# Patient Record
Sex: FEMALE | Race: WHITE | Employment: FULL TIME | URBAN - METROPOLITAN AREA
[De-identification: names, ages, dates, MRNs, and addresses within clinical notes are randomized per-mention and may not be internally consistent; named-entity substitution may affect disease eponyms.]

---

## 2017-03-21 PROCEDURE — 82043 UR ALBUMIN QUANTITATIVE: CPT | Performed by: INTERNAL MEDICINE

## 2017-03-21 PROCEDURE — 82570 ASSAY OF URINE CREATININE: CPT | Performed by: INTERNAL MEDICINE

## 2018-04-14 PROCEDURE — 82043 UR ALBUMIN QUANTITATIVE: CPT | Performed by: INTERNAL MEDICINE

## 2018-04-14 PROCEDURE — 82570 ASSAY OF URINE CREATININE: CPT | Performed by: INTERNAL MEDICINE

## 2019-01-16 ENCOUNTER — HOSPITAL ENCOUNTER (INPATIENT)
Facility: HOSPITAL | Age: 64
LOS: 7 days | Discharge: NURSING, REHAB OR LONG-TERM CARE | DRG: 493 | End: 2019-01-23
Attending: EMERGENCY MEDICINE | Admitting: INTERNAL MEDICINE
Payer: COMMERCIAL

## 2019-01-16 ENCOUNTER — APPOINTMENT (OUTPATIENT)
Dept: GENERAL RADIOLOGY | Facility: HOSPITAL | Age: 64
DRG: 493 | End: 2019-01-16
Attending: EMERGENCY MEDICINE
Payer: COMMERCIAL

## 2019-01-16 ENCOUNTER — ANESTHESIA EVENT (OUTPATIENT)
Dept: SURGERY | Facility: HOSPITAL | Age: 64
DRG: 493 | End: 2019-01-16
Payer: COMMERCIAL

## 2019-01-16 ENCOUNTER — APPOINTMENT (OUTPATIENT)
Dept: GENERAL RADIOLOGY | Facility: HOSPITAL | Age: 64
DRG: 493 | End: 2019-01-16
Attending: INTERNAL MEDICINE
Payer: COMMERCIAL

## 2019-01-16 DIAGNOSIS — S82.891A CLOSED FRACTURE OF RIGHT ANKLE, INITIAL ENCOUNTER: Primary | ICD-10-CM

## 2019-01-16 DIAGNOSIS — S93.04XA DISLOCATION OF RIGHT ANKLE JOINT, INITIAL ENCOUNTER: ICD-10-CM

## 2019-01-16 LAB
ALBUMIN SERPL-MCNC: 3 G/DL (ref 3.1–4.5)
ALBUMIN/GLOB SERPL: 0.9 {RATIO} (ref 1–2)
ALP LIVER SERPL-CCNC: 99 U/L (ref 50–130)
ALT SERPL-CCNC: 27 U/L (ref 14–54)
ANION GAP SERPL CALC-SCNC: 5 MMOL/L (ref 0–18)
APTT PPP: 27 SECONDS (ref 26.1–34.6)
AST SERPL-CCNC: 25 U/L (ref 15–41)
BASOPHILS # BLD AUTO: 0.05 X10(3) UL (ref 0–0.1)
BASOPHILS NFR BLD AUTO: 0.5 %
BILIRUB SERPL-MCNC: 1.2 MG/DL (ref 0.1–2)
BUN BLD-MCNC: 13 MG/DL (ref 8–20)
BUN/CREAT SERPL: 13.7 (ref 10–20)
CALCIUM BLD-MCNC: 8.2 MG/DL (ref 8.3–10.3)
CHLORIDE SERPL-SCNC: 104 MMOL/L (ref 101–111)
CO2 SERPL-SCNC: 29 MMOL/L (ref 22–32)
CREAT BLD-MCNC: 0.95 MG/DL (ref 0.55–1.02)
EOSINOPHIL # BLD AUTO: 0.08 X10(3) UL (ref 0–0.3)
EOSINOPHIL NFR BLD AUTO: 0.9 %
ERYTHROCYTE [DISTWIDTH] IN BLOOD BY AUTOMATED COUNT: 13.2 % (ref 11.5–16)
EST. AVERAGE GLUCOSE BLD GHB EST-MCNC: 180 MG/DL (ref 68–126)
GLOBULIN PLAS-MCNC: 3.4 G/DL (ref 2.8–4.4)
GLUCOSE BLD-MCNC: 204 MG/DL (ref 65–99)
GLUCOSE BLD-MCNC: 322 MG/DL (ref 70–99)
GLUCOSE BLD-MCNC: 324 MG/DL (ref 65–99)
GLUCOSE BLD-MCNC: 335 MG/DL (ref 65–99)
HBA1C MFR BLD HPLC: 7.9 % (ref ?–5.7)
HCT VFR BLD AUTO: 37.8 % (ref 34–50)
HGB BLD-MCNC: 12.8 G/DL (ref 12–16)
IMMATURE GRANULOCYTE COUNT: 0.04 X10(3) UL (ref 0–1)
IMMATURE GRANULOCYTE RATIO %: 0.4 %
INR BLD: 1 (ref 0.9–1.1)
LYMPHOCYTES # BLD AUTO: 0.67 X10(3) UL (ref 0.9–4)
LYMPHOCYTES NFR BLD AUTO: 7.2 %
M PROTEIN MFR SERPL ELPH: 6.4 G/DL (ref 6.4–8.2)
MCH RBC QN AUTO: 30.3 PG (ref 27–33.2)
MCHC RBC AUTO-ENTMCNC: 33.9 G/DL (ref 31–37)
MCV RBC AUTO: 89.4 FL (ref 81–100)
MONOCYTES # BLD AUTO: 0.5 X10(3) UL (ref 0.1–1)
MONOCYTES NFR BLD AUTO: 5.4 %
NEUTROPHIL ABS PRELIM: 7.97 X10 (3) UL (ref 1.3–6.7)
NEUTROPHILS # BLD AUTO: 7.97 X10(3) UL (ref 1.3–6.7)
NEUTROPHILS NFR BLD AUTO: 85.6 %
OSMOLALITY SERPL CALC.SUM OF ELEC: 299 MOSM/KG (ref 275–295)
PLATELET # BLD AUTO: 240 10(3)UL (ref 150–450)
POTASSIUM SERPL-SCNC: 4.4 MMOL/L (ref 3.6–5.1)
PSA SERPL DL<=0.01 NG/ML-MCNC: 13.6 SECONDS (ref 12.4–14.7)
RBC # BLD AUTO: 4.23 X10(6)UL (ref 3.8–5.1)
RED CELL DISTRIBUTION WIDTH-SD: 42.7 FL (ref 35.1–46.3)
SODIUM SERPL-SCNC: 138 MMOL/L (ref 136–144)
WBC # BLD AUTO: 9.3 X10(3) UL (ref 4–13)

## 2019-01-16 PROCEDURE — 0QSGXZZ REPOSITION RIGHT TIBIA, EXTERNAL APPROACH: ICD-10-PCS | Performed by: EMERGENCY MEDICINE

## 2019-01-16 PROCEDURE — 0QSJXZZ REPOSITION RIGHT FIBULA, EXTERNAL APPROACH: ICD-10-PCS | Performed by: EMERGENCY MEDICINE

## 2019-01-16 PROCEDURE — 71045 X-RAY EXAM CHEST 1 VIEW: CPT | Performed by: INTERNAL MEDICINE

## 2019-01-16 PROCEDURE — 99223 1ST HOSP IP/OBS HIGH 75: CPT | Performed by: INTERNAL MEDICINE

## 2019-01-16 PROCEDURE — 73610 X-RAY EXAM OF ANKLE: CPT | Performed by: EMERGENCY MEDICINE

## 2019-01-16 PROCEDURE — 73590 X-RAY EXAM OF LOWER LEG: CPT | Performed by: EMERGENCY MEDICINE

## 2019-01-16 RX ORDER — SODIUM CHLORIDE 9 MG/ML
INJECTION, SOLUTION INTRAVENOUS CONTINUOUS
Status: ACTIVE | OUTPATIENT
Start: 2019-01-16 | End: 2019-01-16

## 2019-01-16 RX ORDER — ETOMIDATE 2 MG/ML
10 INJECTION INTRAVENOUS AS NEEDED
Status: DISCONTINUED | OUTPATIENT
Start: 2019-01-16 | End: 2019-01-22 | Stop reason: ALTCHOICE

## 2019-01-16 RX ORDER — HYDROMORPHONE HYDROCHLORIDE 1 MG/ML
0.5 INJECTION, SOLUTION INTRAMUSCULAR; INTRAVENOUS; SUBCUTANEOUS EVERY 30 MIN PRN
Status: DISCONTINUED | OUTPATIENT
Start: 2019-01-16 | End: 2019-01-16

## 2019-01-16 RX ORDER — LOSARTAN POTASSIUM 25 MG/1
25 TABLET ORAL DAILY
Status: DISCONTINUED | OUTPATIENT
Start: 2019-01-16 | End: 2019-01-22

## 2019-01-16 RX ORDER — HYDROCODONE BITARTRATE AND ACETAMINOPHEN 5; 325 MG/1; MG/1
2 TABLET ORAL EVERY 4 HOURS PRN
Status: DISCONTINUED | OUTPATIENT
Start: 2019-01-16 | End: 2019-01-17

## 2019-01-16 RX ORDER — MIDAZOLAM HYDROCHLORIDE 5 MG/ML
3 INJECTION INTRAMUSCULAR; INTRAVENOUS ONCE
Status: COMPLETED | OUTPATIENT
Start: 2019-01-16 | End: 2019-01-16

## 2019-01-16 RX ORDER — ASPIRIN 81 MG/1
81 TABLET ORAL DAILY
COMMUNITY

## 2019-01-16 RX ORDER — MELATONIN
325
Status: DISCONTINUED | OUTPATIENT
Start: 2019-01-16 | End: 2019-01-23

## 2019-01-16 RX ORDER — MORPHINE SULFATE 4 MG/ML
2 INJECTION, SOLUTION INTRAMUSCULAR; INTRAVENOUS EVERY 2 HOUR PRN
Status: DISCONTINUED | OUTPATIENT
Start: 2019-01-16 | End: 2019-01-23

## 2019-01-16 RX ORDER — ACETAMINOPHEN 325 MG/1
650 TABLET ORAL EVERY 4 HOURS PRN
Status: DISCONTINUED | OUTPATIENT
Start: 2019-01-16 | End: 2019-01-17

## 2019-01-16 RX ORDER — DEXTROSE MONOHYDRATE 25 G/50ML
50 INJECTION, SOLUTION INTRAVENOUS
Status: DISCONTINUED | OUTPATIENT
Start: 2019-01-16 | End: 2019-01-23

## 2019-01-16 RX ORDER — SODIUM CHLORIDE 9 MG/ML
1000 INJECTION, SOLUTION INTRAVENOUS ONCE
Status: COMPLETED | OUTPATIENT
Start: 2019-01-16 | End: 2019-01-16

## 2019-01-16 RX ORDER — ONDANSETRON 2 MG/ML
4 INJECTION INTRAMUSCULAR; INTRAVENOUS EVERY 6 HOURS PRN
Status: DISCONTINUED | OUTPATIENT
Start: 2019-01-16 | End: 2019-01-17

## 2019-01-16 RX ORDER — AMLODIPINE BESYLATE 5 MG/1
10 TABLET ORAL DAILY
Status: DISCONTINUED | OUTPATIENT
Start: 2019-01-16 | End: 2019-01-23

## 2019-01-16 RX ORDER — ONDANSETRON 2 MG/ML
4 INJECTION INTRAMUSCULAR; INTRAVENOUS EVERY 4 HOURS PRN
Status: DISCONTINUED | OUTPATIENT
Start: 2019-01-16 | End: 2019-01-16

## 2019-01-16 RX ORDER — MORPHINE SULFATE 4 MG/ML
INJECTION, SOLUTION INTRAMUSCULAR; INTRAVENOUS
Status: DISPENSED
Start: 2019-01-16 | End: 2019-01-16

## 2019-01-16 RX ORDER — FERROUS SULFATE TAB EC 324 MG (65 MG FE EQUIVALENT) 324 (65 FE) MG
1 TABLET DELAYED RESPONSE ORAL DAILY
COMMUNITY

## 2019-01-16 RX ORDER — MORPHINE SULFATE 4 MG/ML
2 INJECTION, SOLUTION INTRAMUSCULAR; INTRAVENOUS ONCE
Status: COMPLETED | OUTPATIENT
Start: 2019-01-16 | End: 2019-01-16

## 2019-01-16 RX ORDER — DEXTROSE MONOHYDRATE 25 G/50ML
50 INJECTION, SOLUTION INTRAVENOUS
Status: DISCONTINUED | OUTPATIENT
Start: 2019-01-16 | End: 2019-01-16

## 2019-01-16 RX ORDER — MORPHINE SULFATE 4 MG/ML
1 INJECTION, SOLUTION INTRAMUSCULAR; INTRAVENOUS EVERY 2 HOUR PRN
Status: DISCONTINUED | OUTPATIENT
Start: 2019-01-16 | End: 2019-01-23

## 2019-01-16 RX ORDER — MULTIVITAMIN
1 TABLET ORAL DAILY
COMMUNITY

## 2019-01-16 RX ORDER — MORPHINE SULFATE 4 MG/ML
4 INJECTION, SOLUTION INTRAMUSCULAR; INTRAVENOUS EVERY 2 HOUR PRN
Status: DISCONTINUED | OUTPATIENT
Start: 2019-01-16 | End: 2019-01-23

## 2019-01-16 RX ORDER — HYDROCODONE BITARTRATE AND ACETAMINOPHEN 5; 325 MG/1; MG/1
1 TABLET ORAL EVERY 4 HOURS PRN
Status: DISCONTINUED | OUTPATIENT
Start: 2019-01-16 | End: 2019-01-17

## 2019-01-16 RX ORDER — MORPHINE SULFATE 4 MG/ML
2 INJECTION, SOLUTION INTRAMUSCULAR; INTRAVENOUS ONCE
Status: DISCONTINUED | OUTPATIENT
Start: 2019-01-16 | End: 2019-01-16

## 2019-01-16 RX ORDER — ATORVASTATIN CALCIUM 10 MG/1
10 TABLET, FILM COATED ORAL NIGHTLY
Status: DISCONTINUED | OUTPATIENT
Start: 2019-01-16 | End: 2019-01-23

## 2019-01-16 NOTE — ED PROVIDER NOTES
Patient Seen in: [facility] Emergency Department    History   Patient presents with:  Fall (musculoskeletal, neurologic)    Stated Complaint: arrived via EMS post fall    HPI    The patient is a 73-year-old female who presents emergency room with a normocephalic, atraumatic. Pupils are 4 mm equally round and reactive to light. Oropharynx is clear. Mucous membranes are moist.  NECK: There is no focal tenderness to palpation appreciated. There is no JVD.  No meningeal signs or nuchal rigidity appreciate WITH DIFFERENTIAL WITH PLATELET    Narrative: The following orders were created for panel order CBC WITH DIFFERENTIAL WITH PLATELET.   Procedure                               Abnormality         Status                     --------- by Technologist)  The patient shares that she fell this morning and has pain and deformity to her right ankle. FINDINGS:  BONES:  There is osteopenia.   There is a complete oblique fracture involving the distal right fibula with proximal and posterior di hyperglycemia but no other acute abnormalities noted. Patient was given IV pain medication in the emergency room and the patient underwent conscious sedation for reduction of the right ankle as noted below.       The patient required sedation for reduction complaints.           Disposition and Plan     Clinical Impression:  Closed fracture of right ankle, initial encounter  (primary encounter diagnosis)  Dislocation of right ankle joint, initial encounter    Disposition:  Admit  1/16/2019  9:31 am    Follow-u

## 2019-01-16 NOTE — ED INITIAL ASSESSMENT (HPI)
Arrived via EMS for c/o R ankle pain post fall, (+) deformity noted. Pt states she slipped on ice in the driveway and fell. Pt denies LOC, denies hitting head, denies pain to back and neck. Denies any other injuries.  Pt given Fentanyl 100 mcg per EMS with

## 2019-01-16 NOTE — H&P
[facility]                                                               History & Physical         Kylie Sanon Patient Status:  Emergency    10/22/1955 MRN [id number]   Location [address] [facility] Attending Edmond French • Hypertension Mother    • Heart Disorder Mother    • Heart Disorder Father       Reviewed    Social history:   reports that  has never smoked. she has never used smokeless tobacco. She reports that she does not drink alcohol or use drugs.     Allergies: COMPARISON:  EDWARD , XR ANKLE (MIN 3 VIEWS), RIGHT (CPT=73610), 1/16/2019, 7:17.      INDICATIONS:  arrived via EMS post fall     PATIENT STATED HISTORY: (As transcribed by Technologist)  Patient provided no additional history at this time.          ==== 1/16/2019 at 7:39       Approved by: Halina Macdonald MD        Dictated by: Halina Macdonald MD on 1/16/2019 at 7:43       Approved by: Halina Macdonald MD                  Addended by Tresa Antunez MD on 1/16/2019  7:43 AM      Study Result 3.  Give IV pain medications. 2. Insulin-dependent diabetes mellitus type 1 on insulin pump at home and sees endocrinology Dr. Kurt Enciso as outpatient  1. we will consult Dr. Clair Augustin for diabetes mellitus type 2 insulin management in hospital  3.  Essential hype

## 2019-01-16 NOTE — ED NOTES
ANKLE REDUCED VIA DR Margarita Amado. GOOD CMS TO THE RLE PER MD ASSESSMENT. AWAITING POST REDUCTION XRAY.

## 2019-01-16 NOTE — PLAN OF CARE
Diabetes/Glucose Control    • Glucose maintained within prescribed range Progressing        PAIN - ADULT    • Verbalizes/displays adequate comfort level or patient's stated pain goal Progressing        Patient/Family Goals    • Patient/Family [facility]

## 2019-01-17 ENCOUNTER — APPOINTMENT (OUTPATIENT)
Dept: GENERAL RADIOLOGY | Facility: HOSPITAL | Age: 64
DRG: 493 | End: 2019-01-17
Attending: ORTHOPAEDIC SURGERY
Payer: COMMERCIAL

## 2019-01-17 ENCOUNTER — APPOINTMENT (OUTPATIENT)
Dept: CV DIAGNOSTICS | Facility: HOSPITAL | Age: 64
DRG: 493 | End: 2019-01-17
Attending: INTERNAL MEDICINE
Payer: COMMERCIAL

## 2019-01-17 ENCOUNTER — ANESTHESIA (OUTPATIENT)
Dept: SURGERY | Facility: HOSPITAL | Age: 64
DRG: 493 | End: 2019-01-17
Payer: COMMERCIAL

## 2019-01-17 LAB
ANION GAP SERPL CALC-SCNC: 7 MMOL/L (ref 0–18)
BASOPHILS # BLD AUTO: 0.04 X10(3) UL (ref 0–0.1)
BASOPHILS NFR BLD AUTO: 0.4 %
BUN BLD-MCNC: 16 MG/DL (ref 8–20)
BUN/CREAT SERPL: 16 (ref 10–20)
CALCIUM BLD-MCNC: 8.3 MG/DL (ref 8.3–10.3)
CHLORIDE SERPL-SCNC: 102 MMOL/L (ref 101–111)
CO2 SERPL-SCNC: 26 MMOL/L (ref 22–32)
CREAT BLD-MCNC: 1 MG/DL (ref 0.55–1.02)
EOSINOPHIL # BLD AUTO: 0.23 X10(3) UL (ref 0–0.3)
EOSINOPHIL NFR BLD AUTO: 2.4 %
ERYTHROCYTE [DISTWIDTH] IN BLOOD BY AUTOMATED COUNT: 13.3 % (ref 11.5–16)
GLUCOSE BLD-MCNC: 136 MG/DL (ref 65–99)
GLUCOSE BLD-MCNC: 160 MG/DL (ref 65–99)
GLUCOSE BLD-MCNC: 225 MG/DL (ref 70–99)
GLUCOSE BLD-MCNC: 248 MG/DL (ref 65–99)
GLUCOSE BLD-MCNC: 253 MG/DL (ref 65–99)
GLUCOSE BLD-MCNC: 301 MG/DL (ref 65–99)
GLUCOSE BLD-MCNC: 337 MG/DL (ref 65–99)
HAV IGM SER QL: 2.2 MG/DL (ref 1.8–2.5)
HCT VFR BLD AUTO: 36.9 % (ref 34–50)
HGB BLD-MCNC: 12.3 G/DL (ref 12–16)
IMMATURE GRANULOCYTE COUNT: 0.03 X10(3) UL (ref 0–1)
IMMATURE GRANULOCYTE RATIO %: 0.3 %
LYMPHOCYTES # BLD AUTO: 1.33 X10(3) UL (ref 0.9–4)
LYMPHOCYTES NFR BLD AUTO: 13.7 %
MCH RBC QN AUTO: 30.4 PG (ref 27–33.2)
MCHC RBC AUTO-ENTMCNC: 33.3 G/DL (ref 31–37)
MCV RBC AUTO: 91.3 FL (ref 81–100)
MONOCYTES # BLD AUTO: 0.97 X10(3) UL (ref 0.1–1)
MONOCYTES NFR BLD AUTO: 10 %
NEUTROPHIL ABS PRELIM: 7.12 X10 (3) UL (ref 1.3–6.7)
NEUTROPHILS # BLD AUTO: 7.12 X10(3) UL (ref 1.3–6.7)
NEUTROPHILS NFR BLD AUTO: 73.2 %
OSMOLALITY SERPL CALC.SUM OF ELEC: 288 MOSM/KG (ref 275–295)
PLATELET # BLD AUTO: 238 10(3)UL (ref 150–450)
POTASSIUM SERPL-SCNC: 4.6 MMOL/L (ref 3.6–5.1)
RBC # BLD AUTO: 4.04 X10(6)UL (ref 3.8–5.1)
RED CELL DISTRIBUTION WIDTH-SD: 44 FL (ref 35.1–46.3)
SODIUM SERPL-SCNC: 135 MMOL/L (ref 136–144)
WBC # BLD AUTO: 9.7 X10(3) UL (ref 4–13)

## 2019-01-17 PROCEDURE — 3E0T3BZ INTRODUCTION OF ANESTHETIC AGENT INTO PERIPHERAL NERVES AND PLEXI, PERCUTANEOUS APPROACH: ICD-10-PCS | Performed by: ANESTHESIOLOGY

## 2019-01-17 PROCEDURE — 76000 FLUOROSCOPY <1 HR PHYS/QHP: CPT | Performed by: ORTHOPAEDIC SURGERY

## 2019-01-17 PROCEDURE — 93306 TTE W/DOPPLER COMPLETE: CPT | Performed by: INTERNAL MEDICINE

## 2019-01-17 PROCEDURE — 0QSJ04Z REPOSITION RIGHT FIBULA WITH INTERNAL FIXATION DEVICE, OPEN APPROACH: ICD-10-PCS | Performed by: ORTHOPAEDIC SURGERY

## 2019-01-17 PROCEDURE — 99232 SBSQ HOSP IP/OBS MODERATE 35: CPT | Performed by: INTERNAL MEDICINE

## 2019-01-17 PROCEDURE — 0QSG04Z REPOSITION RIGHT TIBIA WITH INTERNAL FIXATION DEVICE, OPEN APPROACH: ICD-10-PCS | Performed by: ORTHOPAEDIC SURGERY

## 2019-01-17 DEVICE — IMPLANTS FOR ORTHOPEDIC BONE FIXATION.
Type: IMPLANTABLE DEVICE | Site: ANKLE | Status: FUNCTIONAL
Brand: VARIABLE ANGLE COMPRESSION SCREW

## 2019-01-17 DEVICE — SURGICAL INSTRUMENT OR ACCESSORY FOR ORTHOPEDIC BONE FIXATION
Type: IMPLANTABLE DEVICE | Site: ANKLE | Status: FUNCTIONAL
Brand: LAG SCREW KIT, 3.5MM

## 2019-01-17 DEVICE — IMPLANTS FOR ORTHOPEDIC BONE FIXATION
Type: IMPLANTABLE DEVICE | Site: ANKLE | Status: FUNCTIONAL
Brand: VARIABLE ANGLE LOCKING SCREW

## 2019-01-17 RX ORDER — HYDROCODONE BITARTRATE AND ACETAMINOPHEN 10; 325 MG/1; MG/1
1-2 TABLET ORAL
Qty: 60 TABLET | Refills: 0 | Status: SHIPPED | OUTPATIENT
Start: 2019-01-17

## 2019-01-17 RX ORDER — DEXTROSE MONOHYDRATE 25 G/50ML
50 INJECTION, SOLUTION INTRAVENOUS
Status: DISCONTINUED | OUTPATIENT
Start: 2019-01-17 | End: 2019-01-17 | Stop reason: HOSPADM

## 2019-01-17 RX ORDER — SODIUM CHLORIDE 9 MG/ML
INJECTION, SOLUTION INTRAVENOUS CONTINUOUS
Status: DISCONTINUED | OUTPATIENT
Start: 2019-01-17 | End: 2019-01-22

## 2019-01-17 RX ORDER — HYDROCODONE BITARTRATE AND ACETAMINOPHEN 10; 325 MG/1; MG/1
2 TABLET ORAL EVERY 4 HOURS PRN
Status: DISCONTINUED | OUTPATIENT
Start: 2019-01-17 | End: 2019-01-23

## 2019-01-17 RX ORDER — NALOXONE HYDROCHLORIDE 0.4 MG/ML
80 INJECTION, SOLUTION INTRAMUSCULAR; INTRAVENOUS; SUBCUTANEOUS AS NEEDED
Status: DISCONTINUED | OUTPATIENT
Start: 2019-01-17 | End: 2019-01-17 | Stop reason: HOSPADM

## 2019-01-17 RX ORDER — ONDANSETRON 2 MG/ML
4 INJECTION INTRAMUSCULAR; INTRAVENOUS EVERY 6 HOURS PRN
Status: DISCONTINUED | OUTPATIENT
Start: 2019-01-17 | End: 2019-01-23

## 2019-01-17 RX ORDER — HYDROMORPHONE HYDROCHLORIDE 1 MG/ML
0.4 INJECTION, SOLUTION INTRAMUSCULAR; INTRAVENOUS; SUBCUTANEOUS EVERY 5 MIN PRN
Status: DISCONTINUED | OUTPATIENT
Start: 2019-01-17 | End: 2019-01-17 | Stop reason: HOSPADM

## 2019-01-17 RX ORDER — MEPERIDINE HYDROCHLORIDE 25 MG/ML
12.5 INJECTION INTRAMUSCULAR; INTRAVENOUS; SUBCUTANEOUS AS NEEDED
Status: DISCONTINUED | OUTPATIENT
Start: 2019-01-17 | End: 2019-01-17 | Stop reason: HOSPADM

## 2019-01-17 RX ORDER — POLYETHYLENE GLYCOL 3350 17 G/17G
17 POWDER, FOR SOLUTION ORAL DAILY PRN
Status: DISCONTINUED | OUTPATIENT
Start: 2019-01-17 | End: 2019-01-23

## 2019-01-17 RX ORDER — BISACODYL 10 MG
10 SUPPOSITORY, RECTAL RECTAL
Status: DISCONTINUED | OUTPATIENT
Start: 2019-01-17 | End: 2019-01-23

## 2019-01-17 RX ORDER — HYDROCODONE BITARTRATE AND ACETAMINOPHEN 10; 325 MG/1; MG/1
2 TABLET ORAL AS NEEDED
Status: DISCONTINUED | OUTPATIENT
Start: 2019-01-17 | End: 2019-01-17 | Stop reason: HOSPADM

## 2019-01-17 RX ORDER — METOCLOPRAMIDE HYDROCHLORIDE 5 MG/ML
10 INJECTION INTRAMUSCULAR; INTRAVENOUS AS NEEDED
Status: DISCONTINUED | OUTPATIENT
Start: 2019-01-17 | End: 2019-01-17 | Stop reason: HOSPADM

## 2019-01-17 RX ORDER — DOCUSATE SODIUM 100 MG/1
100 CAPSULE, LIQUID FILLED ORAL 2 TIMES DAILY
Status: DISCONTINUED | OUTPATIENT
Start: 2019-01-17 | End: 2019-01-23

## 2019-01-17 RX ORDER — HYDROCODONE BITARTRATE AND ACETAMINOPHEN 10; 325 MG/1; MG/1
1 TABLET ORAL EVERY 4 HOURS PRN
Status: DISCONTINUED | OUTPATIENT
Start: 2019-01-17 | End: 2019-01-23

## 2019-01-17 RX ORDER — HYDROCODONE BITARTRATE AND ACETAMINOPHEN 10; 325 MG/1; MG/1
1-2 TABLET ORAL
Qty: 60 TABLET | Refills: 0 | Status: SHIPPED | OUTPATIENT
Start: 2019-01-17 | End: 2019-01-17

## 2019-01-17 RX ORDER — SODIUM CHLORIDE, SODIUM LACTATE, POTASSIUM CHLORIDE, CALCIUM CHLORIDE 600; 310; 30; 20 MG/100ML; MG/100ML; MG/100ML; MG/100ML
INJECTION, SOLUTION INTRAVENOUS CONTINUOUS
Status: DISCONTINUED | OUTPATIENT
Start: 2019-01-17 | End: 2019-01-17 | Stop reason: HOSPADM

## 2019-01-17 RX ORDER — ONDANSETRON 2 MG/ML
4 INJECTION INTRAMUSCULAR; INTRAVENOUS AS NEEDED
Status: DISCONTINUED | OUTPATIENT
Start: 2019-01-17 | End: 2019-01-17 | Stop reason: HOSPADM

## 2019-01-17 RX ORDER — CEFAZOLIN SODIUM 1 G/3ML
INJECTION, POWDER, FOR SOLUTION INTRAMUSCULAR; INTRAVENOUS
Status: DISCONTINUED | OUTPATIENT
Start: 2019-01-17 | End: 2019-01-17

## 2019-01-17 RX ORDER — HYDROCODONE BITARTRATE AND ACETAMINOPHEN 10; 325 MG/1; MG/1
1 TABLET ORAL AS NEEDED
Status: DISCONTINUED | OUTPATIENT
Start: 2019-01-17 | End: 2019-01-17 | Stop reason: HOSPADM

## 2019-01-17 NOTE — CONSULTS
[facility]  Report of Consultation    Junella Kawasaki Patient Status:  Inpatient    10/22/1955 MRN [id number]   [facility] 3SW-A Attending Dimple Pizarro MD   Hosp Day # 0 PCP None Pcp     Reason for Consultation:  Type 1 DM tab 325 mg, 325 mg, Oral, Daily with breakfast  •  acetaminophen (TYLENOL) tab 650 mg, 650 mg, Oral, Q4H PRN **OR** HYDROcodone-acetaminophen (NORCO) 5-325 MG per tab 1 tablet, 1 tablet, Oral, Q4H PRN **OR** HYDROcodone-acetaminophen (NORCO) 5-325 MG per t supraclavicular lymphadenopathy  Cardiovascular:  RRR; no murmurs 2+ pedal pulses  Lungs: CTAB, no wheezes or rales  Abd: soft, nontender, nondistended, active bowel sounds present  MSK: no clubbing, cyanosis, or edema. Right leg elevated and reagan splint.

## 2019-01-17 NOTE — PROGRESS NOTES
[facility]  Progress Note    Junella Kawasaki Patient Status:  Inpatient    10/22/1955 MRN [id number]   [facility] 3SW-A Attending Dimple Pizarro MD   Hosp Day # 1 PCP None Pcp       SUBJECTIVE:  No acute events overnight.   Hyper present  MSK: no clubbing, cyanosis, or edema. Right leg elevated and reagan splint.         Data Review:   Labs:   Lab Results   Component Value Date    WBC 9.7 01/17/2019    HGB 12.3 01/17/2019    HCT 36.9 01/17/2019    .0 01/17/2019    CREATSERUM 1.0 Min PRN   Or      dextrose 50 % injection 50 mL 50 mL Intravenous Q15 Min PRN   Or      glucose (DEX4) oral liquid 30 g 30 g Oral Q15 Min PRN   Or      Glucose-Vitamin C (DEX-4) 4-6 GM-MG chewable tab 8 tablet 8 tablet Oral Q15 Min PRN   Insulin Aspart Pen

## 2019-01-17 NOTE — CONSULTS
[facility]  Report of Consultation    Kylie Sanon Patient Status:  Inpatient    10/22/1955 MRN [id number]   [facility] 3SW-A Attending Flores Colby MD   [facility] Day # 1 PCP None Pcp     Reason for Consultation:  Right ankle trima Hypertension Mother    • Heart Disorder Mother    • Heart Disorder Father       reports that  has never smoked. she has never used smokeless tobacco. She reports that she does not drink alcohol or use drugs.     Allergies:  No Known Allergies    Medications review of systems was negative. 10 point system review    Physical Exam:    General: Alert, orientated x3. Cooperative. No apparent distress. Vital Signs:  Blood pressure 142/54, pulse 80, temperature 98.6 °F (37 °C), temperature source Oral, resp.  rate failure of surgery, and need for future surgery. We also discussed the surgical benefits, alternatives, surgical room allotments and personnel, expected outcome and expected recovery.   The patient is in agreement with our plan and all questions were satis

## 2019-01-17 NOTE — CONSULTS
[facility]  Report of Consultation    Norman Webb Patient Status:  Inpatient    10/22/1955 MRN [id number]   [facility] 3SW-A Attending Maria Teresa Cavazos MD   Hosp Day # 1 PCP None Pcp     Reason for Consultation:  Pre-op evaluat mg, Oral, Daily  •  ferrous sulfate EC tab 325 mg, 325 mg, Oral, Daily with breakfast  •  acetaminophen (TYLENOL) tab 650 mg, 650 mg, Oral, Q4H PRN **OR** HYDROcodone-acetaminophen (NORCO) 5-325 MG per tab 1 tablet, 1 tablet, Oral, Q4H PRN **OR** HYDROcodo excursions and effort. Abdomen: Soft, non-tender. Obese. Extremities: Without clubbing, cyanosis or edema. Right ankle immobilized. Neurologic: Alert and oriented, normal affect. Skin: Warm and dry.      Laboratories and Data:  Diagnostics:  EKG: NSR,

## 2019-01-17 NOTE — PLAN OF CARE
OR called regarding pt's blood sugars trending downwards since overnight. Overnight was in 300s and at 1600 was 136. Passed on to OR to watch this closely.

## 2019-01-17 NOTE — PLAN OF CARE
Diabetes/Glucose Control    • Glucose maintained within prescribed range Progressing        PAIN - ADULT    • Verbalizes/displays adequate comfort level or patient's stated pain goal Progressing        Patient/Family Goals    • Patient/Family [facility]

## 2019-01-17 NOTE — PAYOR COMM NOTE
--------------PATIENT IS SCHEDULED FOR ANKLE OPEN REDUCTION INTERNAL FIXATION TODAY    ADMISSION REVIEW     Payor: [address] #:  [id number]  Authorization Number: [id number]    Admit date: 1/16/19  Admit time: 9715 Systems    Positive for stated complaint: arrived via EMS post fall  Other systems are as noted in HPI. Constitutional and vital signs reviewed. All other systems reviewed and negative except as noted above.     Physical Exam     ED Triage Vitals [01/ extremities. There are no gross motor or sensory deficits appreciated. Patient is answering all questions appropriately.              ED Course     Labs Reviewed   COMP METABOLIC PANEL (14) - Abnormal; Notable for the following components:       Result Ira distal right fibula with proximal, lateral and dorsal displacement. There is a transverse fracture of the right lobe medial malleolus with lateral displacement. There is a posterior malleolar fracture with proximal and dorsal displacement and angulation. 7:43     Approved by: Evette Mujica MD             Result Date: 1/16/2019  CONCLUSION:  There is a complete oblique fracture involving the distal right fibula with proximal and posterior displacement with some angulation.   There is a transverse fractu minutes during which I was present. Admission disposition: 1/16/2019  9:31 AM                     Postreduction films showed the ankle to be back in more anatomic position.   The patient was placed into a short leg posterior mold with sugar tong componen Status post mechanical fall home and right ankle pain    History of Present Illness:  Giovanny Christensen is a 66year old female admitted  status post mechanical fall at home and constant right ankle pain since then.   Patient states she was walking in fron hospital admission)    Review of Systems:  A comprehensive 14 point review of systems was completed. Pertinent positives and negatives noted in the the HPI.     Physical Exam:     Vital signs: Blood pressure 155/79, pulse 90, temperature 98.6 °F (37 °C), t of the right talus in relation to the distal right tibia. Improved alignment of the trimalleolar fracture. Stable widening of the medial ankle joint space. Diffuse soft tissue swelling.            Dictated by: Aidan Ochoa MD on 1/16/2019 at 9:01 (CPT=73590)     TECHNIQUE:  AP and lateral views of the tibia and fibula were obtained. COMPARISON:  None.      INDICATIONS:  arrived via EMS post fall     PATIENT STATED HISTORY: (As transcribed by Technologist)  The patient shares that she fell this m pressure  4. Hyperlipidemia–continue statin  5. Pre-op- will get EKG, CXR. Quality:  · DVT Prophylaxis: SCD.   Pharmacological prophylaxis when okay with Ortho  · CODE status: full  · Snider: no    Plan of care discussed with patient , pt's sister in l insulin detemir (LEVEMIR) 100 UNIT/ML flextouch 20 Units     Date Action Dose Route User    1/16/2019 1509 Given 20 Units Subcutaneous (Left Upper Arm) Faye Swenson RN      insulin detemir (LEVEMIR) 100 UNIT/ML flextouch 25 Units     Date Action Do History      Not on file    Tobacco Use      Smoking status: Never Smoker      Smokeless tobacco: Never Used    Substance and Sexual Activity      Alcohol use: No        Frequency: Never      Drug use: No      Sexual activity: Not on file    Other Topics Medications:  insulin detemir (LEVEMIR) 100 UNIT/ML flextouch 25 Units 25 Units Subcutaneous Daily   etomidate (AMIDATE) solution 10 mg 10 mg Intravenous PRN   atorvastatin (LIPITOR) tab 10 mg 10 mg Oral Nightly   Losartan Potassium (COZAAR) tab 25 mg 25 m discharge     Closed right ankle fracture - Ortho consulted.  Will be going to the OR later today     Essential Hypertension - continue ARB     Mixed Hyperlipidemia - continue statin                        Plan of care discussed with patient/family at bedsi she does not drink alcohol or use drugs. She is single. She works full time for [employer].      Allergies:  No Known Allergies        Review of Systems:  All systems were reviewed and are negative except as described above in HPI.     Physical Exam:  Blood pressu Closed fracture of right ankle, initial encounter     Dislocation of right ankle joint, initial encounter     Abnormal EKG     EKG changes are more likely due to body habitus (low voltage) and lead misplacement (PRWP).   Normal cardiac exam.  Will review Past Medical History:   Diagnosis Date   • Diabetes [facility])     • Essential hypertension              Past Surgical History:   Procedure Laterality Date   • ABSCESS DRAINAGE X-RAY/US/CT CTRL       • REMOVAL OF OVARIAN CYST(S)                Family History 01/16/2019     ALKPHO 99 01/16/2019     BILT 1.2 01/16/2019     TP 6.4 01/16/2019     AST 25 01/16/2019     ALT 27 01/16/2019     PTT 27.0 01/16/2019     INR 1.00 01/16/2019     PTP 13.6 01/16/2019     PGLU 204 01/16/2019             Recent Labs      01/16

## 2019-01-17 NOTE — PLAN OF CARE
PAIN - ADULT    • Verbalizes/displays adequate comfort level or patient's stated pain goal Progressing        SAFETY ADULT - FALL    • Free from fall injury Progressing            A&O x 4. VSS. On RA. RLE pain controlled with Morphine and Norco PRN.  Post m

## 2019-01-17 NOTE — PROGRESS NOTES
[facility]  Progress Note    Chidi Augustin Patient Status:  Inpatient    10/22/1955 MRN [id number]   [facility] 3SW-A Attending Matt Avitia MD   Hosp Day # 1 PCP None Pcp     CC: Status post mechanical fall at home and right an CO2 26.0 01/17/2019     01/17/2019    CA 8.3 01/17/2019    MG 2.2 01/17/2019    PGLU 136 01/17/2019       Imaging:   XR CHEST AP PORTABLE      TECHNIQUE:  AP chest radiograph was obtained. COMPARISON:  None.      INDICATIONS:  pre-op     PATIE Daily with breakfast   acetaminophen (TYLENOL) tab 650 mg 650 mg Oral Q4H PRN   Or      HYDROcodone-acetaminophen (NORCO) 5-325 MG per tab 1 tablet 1 tablet Oral Q4H PRN   Or      HYDROcodone-acetaminophen (NORCO) 5-325 MG per tab 2 tablet 2 tablet Oral Q4 hypertension, morbid obesity        Quality:  · DVT Prophylaxis: SCD.   Pharmacological prophylaxis when okay with Ortho  · CODE status: full  · Snider: no      Estimated date of discharge: TBD  Discharge is dependent on: Clinical progress  At this point Ms.

## 2019-01-18 LAB
ATRIAL RATE: 88 BPM
GLUCOSE BLD-MCNC: 213 MG/DL (ref 65–99)
GLUCOSE BLD-MCNC: 231 MG/DL (ref 65–99)
GLUCOSE BLD-MCNC: 255 MG/DL (ref 65–99)
GLUCOSE BLD-MCNC: 304 MG/DL (ref 65–99)
P AXIS: 64 DEGREES
P-R INTERVAL: 152 MS
Q-T INTERVAL: 358 MS
QRS DURATION: 82 MS
QTC CALCULATION (BEZET): 433 MS
R AXIS: -39 DEGREES
T AXIS: 15 DEGREES
VENTRICULAR RATE: 88 BPM

## 2019-01-18 PROCEDURE — 99232 SBSQ HOSP IP/OBS MODERATE 35: CPT | Performed by: INTERNAL MEDICINE

## 2019-01-18 RX ORDER — HYDRALAZINE HYDROCHLORIDE 20 MG/ML
10 INJECTION INTRAMUSCULAR; INTRAVENOUS EVERY 6 HOURS PRN
Status: DISCONTINUED | OUTPATIENT
Start: 2019-01-18 | End: 2019-01-23

## 2019-01-18 RX ORDER — KETOROLAC TROMETHAMINE 30 MG/ML
30 INJECTION, SOLUTION INTRAMUSCULAR; INTRAVENOUS EVERY 6 HOURS
Status: COMPLETED | OUTPATIENT
Start: 2019-01-18 | End: 2019-01-19

## 2019-01-18 RX ORDER — HYDRALAZINE HYDROCHLORIDE 20 MG/ML
10 INJECTION INTRAMUSCULAR; INTRAVENOUS EVERY 6 HOURS PRN
Status: DISCONTINUED | OUTPATIENT
Start: 2019-01-18 | End: 2019-01-18

## 2019-01-18 NOTE — OCCUPATIONAL THERAPY NOTE
OCCUPATIONAL THERAPY EVALUATION - INPATIENT     Room Number: 380/380-A  Evaluation Date: 1/18/2019  Type of Evaluation: Initial  Presenting Problem: (R ankle ORIF)    Physician Order: IP Consult to Occupational Therapy  Reason for Therapy: ADL/IADL Dysfunc to heal    OBJECTIVE  Precautions: None  Fall Risk: High fall risk    WEIGHT BEARING RESTRICTION  Weight Bearing Restriction: R lower extremity        R Lower Extremity: Non-Weight Bearing       PAIN ASSESSMENT  Ratin          COGNITION  Overall Cognit In sitting, patient denied RLE pain, reports numbness. Patient encouraged to sit up at edge of bed , BP checked 160/93. O2 not reading well, oxygen re-applied. OT demonstrated use of RW while NWB on RLE. Patient reports she feels more dizzy while sitting. patient should achieve modified independent level in ADLs, light or modified IADLs.        Patient Complexity  Occupational Profile/Medical History LOW - Brief history including review of medical or therapy records    Specific performance deficits impacting

## 2019-01-18 NOTE — PROGRESS NOTES
S/p left ankle orif  Doing well  NVI  Comp soft  No calf pain  Pain controlled  NWB LLE  DC home tomorrow  F/u donna in 1 week

## 2019-01-18 NOTE — PROGRESS NOTES
NURSING ADMISSION NOTE      Patient admitted via bed. Oriented to room. Safety precautions initiated. Bed in low position. Call light in reach. Pt denies pain/numbness/tingling. VS stable. Ace wrap and ice packs to RLE.   All needs met, [facility]

## 2019-01-18 NOTE — PLAN OF CARE
Diabetes/Glucose Control    • Glucose maintained within prescribed range Progressing        PAIN - ADULT    • Verbalizes/displays adequate comfort level or patient's stated pain goal Progressing        Patient/Family Goals    • Patient/Family [facility]

## 2019-01-18 NOTE — PLAN OF CARE
Per Dr. Channing Blue, pt requests insulin pump to be reattached. RN received pump from security. Page sent to endo for orders for pump. Will do insulin pump contract with pt. Dr. Reuben King returned call and spoke with pt and RN.  Pt states that homero fisher

## 2019-01-18 NOTE — CM/SW NOTE
01/18/19 1300   CM/SW Referral Data   Referral Source Physician   Reason for Referral Discharge planning   Informant Patient   Patient Info   Patient's Mental Status Alert;Oriented   Patient's [address]   Number of Levels in Home 1   Number

## 2019-01-18 NOTE — OPERATIVE REPORT
[facility]    PATIENT'S NAME: Drake Salgado   ATTENDING PHYSICIAN: Brice Smith M.D. OPERATING PHYSICIAN: Krzysztof Hutchins M.D.    PATIENT ACCOUNT#:   [de-identified]    LOCATION:  [address] [place] [address]  MEDICAL RECORD #:   [id number]       DATE OF BIRTH: screws, excellent fixation was seen. We irrigated the wounds, stressed the ankle. No medial clear space finding or instability was noted. We closed our wounds with 0 Vicryl, 2-0 Vicryl, and staples.   The patient was placed in a soft sterile dressing, mo

## 2019-01-18 NOTE — PROGRESS NOTES
Post Op Day 1 Ortho Note    Status Post Nerve Block:  Type of Nerve Block: Right popliteal and saphenous block  Single Injection Nerve Block x 2    Post op review: No evidence of immediate block related complications, No paresthesia noted and Patients with

## 2019-01-18 NOTE — PHYSICAL THERAPY NOTE
PHYSICAL THERAPY EVALUATION - INPATIENT     Room Number: 380/380-A  Evaluation Date: 1/18/2019  Type of Evaluation: Initial  Physician Order: PT Eval and Treat    Presenting Problem: right ankle trimalleolar fracture s/p ORIf (1/17/19)  Reason for Th RESTRICTION  Weight Bearing Restriction: R lower extremity        R Lower Extremity: Non-Weight Bearing       PAIN ASSESSMENT  Ratin  Location: denies pain       COGNITION  · Safety Judgement:  decreased awareness of need for assistance and decreased a verbalizes understanding. Pt demonstrates bed mobility with mod asst for RLE advancement and trunk support with max verbal and tactile cues for sequencing. Pt with c/o dizziness upon sitting; blood pressure 160/93 taken on left lower leg.  Pt with difficult level in bed mobility, transfers, short distance ambulation, and stoop negotiation. DISCHARGE RECOMMENDATIONS  PT Discharge Recommendations: Sub-acute rehabilitation(ELOS of 16-19 days)    PLAN  PT Treatment Plan: Bed mobility; Endurance; Energy conservat

## 2019-01-18 NOTE — ANESTHESIA POSTPROCEDURE EVALUATION
[facility] Patient Status:  Inpatient   Age/Gender 66year old female MRN [id number]   Location [address] [facility] Attending Elba Henderson MD   [facility] Day # 1 PCP None Pcp       Anesthesia Post-op Note    Procedure(s):  OPEN R

## 2019-01-18 NOTE — PROGRESS NOTES
[facility]  Progress Note    Jaciel Watson Patient Status:  Inpatient    10/22/1955 MRN [id number]   [facility] 3SW-A Attending Christine Huertas MD   Hosp Day # 2 PCP None Pcp     CC: Status post mechanical fall at home and right an pre-op     PATIENT STATED HISTORY: (As transcribed by Technologist)  Patient fell on ice this morning and has a ankle fx and this is for a pre-op tomorrow.          FINDINGS:  No focal consolidation, pleural effusion, or pneumothorax.     =====  CONCLUSION: HYDROcodone-acetaminophen (NORCO)  MG per tab 1 tablet 1 tablet Oral Q4H PRN   Or      HYDROcodone-acetaminophen (NORCO)  MG per tab 2 tablet 2 tablet Oral Q4H PRN   etomidate (AMIDATE) solution 10 mg 10 mg Intravenous PRN   atorvastatin (LIP management in hospital  3. Essential hypertension–continue home medication. Follow blood pressure  4. Hyperlipidemia–continue statin  5.  Intermediate risk of surgery due to underlying medical problems including diabetes, hypertension, morbid obesity

## 2019-01-18 NOTE — PROGRESS NOTES
[facility]  Cardiology Progress Note    Osmel Mccarthy Patient Status:  Inpatient    10/22/1955 MRN [id number]   [facility] 3SW-A Attending John Membreno MD   Hosp Day # 2 PCP None Pcp     Subjective:  Having a lot of pain in RLE, Thanks.     Brianna Kaye MD

## 2019-01-18 NOTE — PROGRESS NOTES
ENDOCRINOLOGY PROGRESS NOTE    Typed by Hermelindo Wray MD on 1/18/2019      S:  Pt resting in bed. Feeling fatigued after trying some physical therapy today.       O:  BP (!) 165/65 (BP Location: Left leg)   Pulse 91   Temp 98.2 °F (36.8 °C) (Oral AND PLAN:    1. Type 1 DM: uncontrolled with hyperglycemia: HgA1C 7.9%  2. Long term insulin use  3.  Insulin pump status      · Adjust regimen as follows:     Levemir 30 Units with breakfast     Novolog 1 Unit for every 5 grams of carbs with meals     [place]

## 2019-01-18 NOTE — PLAN OF CARE
Pt's BP remains elevated even with pain medications. Dr. Lien Zelaya paged regarding hydralazine being ordered with no parameters of BP. Awaiting new orders.

## 2019-01-18 NOTE — ANESTHESIA PREPROCEDURE EVALUATION
PRE-OP EVALUATION    Patient Name: John Vela    Pre-op Diagnosis: FRACTURE RIGHT ANKLE    Procedure(s):  OPEN REDUCTION INTERNAL FIXATION RIGHT ANKLE    Surgeon(s) and Role:     Rayo Cueto MD - Primary    Pre-op vitals reviewed.   Temp: 98 injection 4 mg 4 mg Intravenous Q6H PRN   [COMPLETED] morphINE sulfate (PF) 4 MG/ML injection 2 mg 2 mg Intravenous Once   [MAR Hold] glucose (DEX4) oral liquid 15 g 15 g Oral Q15 Min PRN   Or      [MAR Hold] Glucose-Vitamin C (DEX-4) 4-6 GM-MG chewable ta insulin                         Pulmonary                    (+) sleep apnea       Neuro/Psych                                    Past Surgical History:   Procedure Laterality Date   • ABSCESS DRAINAGE X-RAY/US/CT CTRL     • REMOVAL OF OVARIAN CYST(S) hypertension  • Hyperlipidemia  • Insulin pump status  • Type 1 diabetes mellitus without complication ([facility] [address]

## 2019-01-18 NOTE — PROGRESS NOTES
Critical [facility] hospital Pharmacy Note: Antimicrobial Weight Dose Adjustment for: cefazolin (ANCEF)    Antonio Mendez is a 66year old female who has been prescribed cefazolin (ANCEF) 2g every 8 hours x2 doses for surgical prophylaxis.   CrCl is estimated creatinine clearan

## 2019-01-19 LAB
GLUCOSE BLD-MCNC: 152 MG/DL (ref 65–99)
GLUCOSE BLD-MCNC: 199 MG/DL (ref 65–99)
GLUCOSE BLD-MCNC: 215 MG/DL (ref 65–99)
GLUCOSE BLD-MCNC: 217 MG/DL (ref 65–99)
GLUCOSE BLD-MCNC: 274 MG/DL (ref 65–99)
GLUCOSE BLD-MCNC: 279 MG/DL (ref 65–99)

## 2019-01-19 PROCEDURE — 99232 SBSQ HOSP IP/OBS MODERATE 35: CPT | Performed by: INTERNAL MEDICINE

## 2019-01-19 NOTE — PROGRESS NOTES
[facility]  Progress Note    Renae Dominik Patient Status:  Inpatient    10/22/1955 MRN [id number]   [facility] 3SW-A Attending Cheryl Laurent MD   Hosp Day # 3 PCP None Pcp     CC: Status post mechanical fall at home and right an morning and has a ankle fx and this is for a pre-op tomorrow. FINDINGS:  No focal consolidation, pleural effusion, or pneumothorax.     =====  CONCLUSION:  No focal consolidation.      Dictated by: Yodit Obrien MD on 1/16/2019 at 13:38        EKG:   N PRN   Or      HYDROcodone-acetaminophen (NORCO)  MG per tab 2 tablet 2 tablet Oral Q4H PRN   etomidate (AMIDATE) solution 10 mg 10 mg Intravenous PRN   atorvastatin (LIPITOR) tab 10 mg 10 mg Oral Nightly   Losartan Potassium (COZAAR) tab 25 mg 25 mg hypertension–continue home medication. Follow blood pressure. Blood pressure controlled today  4. Hyperlipidemia–continue statin     Quality:  · DVT Prophylaxis: SCD.  Eliquis  · CODE status: full  · Snider: no      Estimated date of discharge: TBD  Discha

## 2019-01-19 NOTE — OCCUPATIONAL THERAPY NOTE
OCCUPATIONAL THERAPY TREATMENT NOTE - INPATIENT     Room Number: 380/380-A  Session: 1   Number of Visits to Meet Established Goals: 4    Presenting Problem: R ankle ORIF    History related to current admission: Admitted via ED after falling on ice,  fract Modifier (G-Code): CL    FUNCTIONAL TRANSFER ASSESSMENT  Supine to Sit : Not tested  Sit to Stand: Dependent assistance(Max A x 2)    Skilled Therapy Provided: Pt in recliner chair upon entering room.  Educated pt on NWB status and reinforced importance to Daily      OT Goals: ONGOING 1/19/19  ADL Goals   Patient will perform lower body dressing:  with mod assist, with adaptive equipment PRN and while maintaining weight bearing status  Patient will perform toileting: with min assist    Functional Transfer Go

## 2019-01-19 NOTE — PLAN OF CARE
Diabetes/Glucose Control    • Glucose maintained within prescribed range Progressing        PAIN - ADULT    • Verbalizes/displays adequate comfort level or patient's stated pain goal Progressing        Patient/Family Goals    • Patient/Family [facility]

## 2019-01-19 NOTE — PHYSICAL THERAPY NOTE
PHYSICAL THERAPY TREATMENT NOTE - INPATIENT    Room Number: 380/380-A     Session: 1   Number of Visits to Meet Established Goals: 5    Presenting Problem: right ankle trimalleolar fracture s/p ORIf (1/17/19)     History related to current admission: Pt i wheelchair, bedside commode, etc.): A Lot   -   Moving from lying on back to sitting on the side of the bed?: A Lot   How much help from another person does the patient currently need. ..   -   Moving to and from a bed to a chair (including a wheelchair)?: DISCHARGE RECOMMENDATIONS  PT Discharge Recommendations: Sub-acute rehabilitation(ELOS of 16-19 days)     PLAN  PT Treatment Plan: Bed mobility; Endurance; Energy conservation;Patient education;Gait training;Strengthening;Stoop training;Transfer zarina

## 2019-01-19 NOTE — PROGRESS NOTES
ENDOCRINOLOGY PROGRESS NOTE    Typed by Hiram Torres MD on 1/19/2019      S: Pt sitting up in the chair. Is eager to restart her insulin pump and wants to start it tomorrow morning.       O:  /68 (BP Location: Left arm)   Pulse 92   Temp 9 Hemoglobin      12.0 - 16.0 g/dL 12.3   Hematocrit      34.0 - 50.0 % 36.9   Platelet Count      723.1 - 450.0 10(3)uL 238.0           ASSESSMENT AND PLAN:    1. Type 1 DM: uncontrolled with hyperglycemia: HgA1C 7.9%. Glucoses are elevated.   2. Long term

## 2019-01-19 NOTE — PROGRESS NOTES
4 [facility] Patient Status:  Inpatient    10/22/1955 MRN [id number]   [facility] 3SW-A Attending Linda Collins MD   Hosp Day # 3 PCP None Pcp     Subjective:  Left ankle ORIF  Systemic or Specific Complaints: Mariajose Pa

## 2019-01-20 LAB
GLUCOSE BLD-MCNC: 234 MG/DL (ref 65–99)
GLUCOSE BLD-MCNC: 255 MG/DL (ref 65–99)
GLUCOSE BLD-MCNC: 283 MG/DL (ref 65–99)
GLUCOSE BLD-MCNC: 284 MG/DL (ref 65–99)
GLUCOSE BLD-MCNC: 321 MG/DL (ref 65–99)

## 2019-01-20 PROCEDURE — 99232 SBSQ HOSP IP/OBS MODERATE 35: CPT | Performed by: INTERNAL MEDICINE

## 2019-01-20 NOTE — OCCUPATIONAL THERAPY NOTE
OCCUPATIONAL THERAPY TREATMENT NOTE - INPATIENT     Room Number: 380/380-A  Session: 2   Number of Visits to Meet Established Goals: 4    Presenting Problem: right ankle ORIF    History related to current admission: Admitted via ED after falling on ice,  f 34.69  CMS Modifier (G-Code): CK    FUNCTIONAL TRANSFER ASSESSMENT  Supine to Sit : Supervision  Sit to Stand: Minimum assistance    Skilled Therapy Provided: Pt performed supine to sit with SBA, sitting balance at EOB SBA.  Pt performed sit to stand and am Program Goal  Patient will be supervision with bilateral AROM HEP (home exercise program).

## 2019-01-20 NOTE — PHYSICAL THERAPY NOTE
PHYSICAL THERAPY TREATMENT NOTE - INPATIENT    Room Number: 380/380-A     Session: 2  Number of Visits to Meet Established Goals: 4    Presenting Problem: Right ankle trimalleolar fracture S/p ORIF on 1/17/19     History related to current admission: Pt i patient currently have. ..  -   Turning over in bed (including adjusting bedclothes, sheets and blankets)?: A Little   -   Sitting down on and standing up from a chair with arms (e.g., wheelchair, bedside commode, etc.): A Little   -   Moving from lying on applied; Discussed recommendations with /    ASSESSMENT     Patient is a 66year old female admitted on 1/16/2019 for right ankle trimalleolar fracture s/p ORIF on 01/17/19.  Patient demonstrates progress in activity tolerance, able

## 2019-01-20 NOTE — PROGRESS NOTES
ENDOCRINOLOGY PROGRESS NOTE    Typed by Jude Madrigal MD on 1/20/2019      S: Pt restarted pump this morning. Sitting up in the chair. Hoping to be discharged to rehab tomorrow.       O: /70 (BP Location: Left leg)   Pulse 94   Temp 98.2 °F 12.0 - 16.0 g/dL 12.3   Hematocrit      34.0 - 50.0 % 36.9   Platelet Count      120.2 - 450.0 10(3)uL 238.0           ASSESSMENT AND PLAN:    1. Type 1 DM: uncontrolled with hyperglycemia: HgA1C 7.9%. Glucoses are elevated. 2. Long term insulin use  3.  I

## 2019-01-20 NOTE — CM/SW NOTE
SW contacted [facility] to assist with discharge planning for today. Pt does not have insurance approval per Alyx Guzman of [id number]. RN updated.     Mau Lee MSW, LCSW   for Maternal/Child Services at [facility]

## 2019-01-20 NOTE — PROGRESS NOTES
[facility]  Progress Note    Norma Jean Patient Status:  Inpatient    10/22/1955 MRN [id number]   [facility] 3SW-A Attending Garo Groves MD   Hosp Day # 4 PCP None Pcp     CC: Status post mechanical fall at home and right an fell on ice this morning and has a ankle fx and this is for a pre-op tomorrow. FINDINGS:  No focal consolidation, pleural effusion, or pneumothorax.     =====  CONCLUSION:  No focal consolidation.      Dictated by: Leesa Webb MD on 1/16/2019 at 13:3 Potassium (COZAAR) tab 25 mg 25 mg Oral Daily   AmLODIPine Besylate (NORVASC) tab 10 mg 10 mg Oral Daily   ferrous sulfate EC tab 325 mg 325 mg Oral Daily with breakfast   morphINE sulfate (PF) 4 MG/ML injection 1 mg 1 mg Intravenous Q2H PRN   Or      morp 0-1 days  Discharge is dependent on: Clinical progress  At this point Ms. Lilliam Gregory  is expected to be discharge to: Subacute rehab based on PT OT after surgery        Questions/concerns and Plan of care were discussed with patient and family by bedside.

## 2019-01-20 NOTE — PROGRESS NOTES
4 [facility] Patient Status:  Inpatient    10/22/1955 MRN [id number]   [facility] 3SW-A Attending Maryellen Negrete MD   [facility] Day # 4 PCP None Pcp     Subjective:  Left ankle ORIF  Systemic or Specific Complaints: No c

## 2019-01-21 LAB
GLUCOSE BLD-MCNC: 188 MG/DL (ref 65–99)
GLUCOSE BLD-MCNC: 244 MG/DL (ref 65–99)
GLUCOSE BLD-MCNC: 250 MG/DL (ref 65–99)
GLUCOSE BLD-MCNC: 275 MG/DL (ref 65–99)

## 2019-01-21 PROCEDURE — 99232 SBSQ HOSP IP/OBS MODERATE 35: CPT | Performed by: INTERNAL MEDICINE

## 2019-01-21 NOTE — PHYSICAL THERAPY NOTE
PHYSICAL THERAPY TREATMENT NOTE - INPATIENT    Room Number: 380/380-A     Session: 2   Number of Visits to Meet Established Goals: 4    Presenting Problem: Right ankle trimalleolar fracture S/p ORIF on 1/17/19    Problem List  Principal Problem:    Closed the side of the bed?: A Little   How much help from another person does the patient currently need. ..   -   Moving to and from a bed to a chair (including a wheelchair)?: A Little   -   Need to walk in hospital room?: A Little   -   Climbing 3-5 steps with three occasions and found to be abnormally elevated.  RN immediately notified of this and RN asked for PT to assist pt back to bed and will notify MD. Discussed with pt, that when pt is medically appropriate, pt should attempt with assistance from nursing s

## 2019-01-21 NOTE — PROGRESS NOTES
[facility]  Progress Note    Herbert Mcneil Patient Status:  Inpatient    10/22/1955 MRN [id number]   [facility] 3SW-A Attending Yuly Gonzales MD   Hosp Day # 5 PCP None Pcp     CC: Status post mechanical fall at home and right an Patient fell on ice this morning and has a ankle fx and this is for a pre-op tomorrow. FINDINGS:  No focal consolidation, pleural effusion, or pneumothorax.     =====  CONCLUSION:  No focal consolidation.      Dictated by: Latia Villeda MD on 1/16/2019 Losartan Potassium (COZAAR) tab 25 mg 25 mg Oral Daily   AmLODIPine Besylate (NORVASC) tab 10 mg 10 mg Oral Daily   ferrous sulfate EC tab 325 mg 325 mg Oral Daily with breakfast   morphINE sulfate (PF) 4 MG/ML injection 1 mg 1 mg Intravenous Q2H PRN   O discharge: 0-1 days  Discharge is dependent on: Clinical progress  At this point Ms. Lulu Bedolla  is expected to be discharge to: Subacute rehab based on PT OT after surgery        Questions/concerns and Plan of care were discussed with patient and family by rios

## 2019-01-21 NOTE — OCCUPATIONAL THERAPY NOTE
OCCUPATIONAL THERAPY TREATMENT NOTE - INPATIENT     Room Number: 380/380-A  Session: 2   Number of Visits to Meet Established Goals: 4    Presenting Problem: right ankle ORIF    History related to current admission: Admitted via ED after falling on ice,  f Taking care of personal grooming such as brushing teeth?: A Little  -   Eating meals?: A Little    AM-PAC Score:  Score: 15  Approx Degree of Impairment: 56.46%  Standardized Score (AM-PAC Scale): 34.69  CMS Modifier (G-Code): CK    FUNCTIONAL TRANSFER A independence in ADL tasks, however patient remains below her baseline in these and other functional areas.  Patient would benefit from continued OT services during hospital stay to further address these deficits and assist patient in returning to prior leve

## 2019-01-21 NOTE — CM/SW NOTE
Update sent via [facility] to [address] [place] [address] Drive [phone]. Message left for liaison re: pt cleared for d/c today. Awaiting return call, insurance auth pending.

## 2019-01-21 NOTE — PROGRESS NOTES
ENDOCRINOLOGY PROGRESS NOTE    Typed by Daina Jay MD on 1/21/2019      S: Pt awaiting insurance approval to be discharged to [facility] - hopefully later today. Sitting up in the chair comfortably.        O: BP (!) 164/60 (BP Location: Left leg) 4.04   Hemoglobin      12.0 - 16.0 g/dL 12.3   Hematocrit      34.0 - 50.0 % 36.9   Platelet Count      305.7 - 450.0 10(3)uL 238.0           ASSESSMENT AND PLAN:    1. Type 1 DM: uncontrolled with hyperglycemia: HgA1C 7.9%. Glucoses are elevated.   2. Long reconciliation for discharge.       Adebayo Swenson MD  Endocrinology, Diabetes and Metabolism  [facility]

## 2019-01-21 NOTE — PLAN OF CARE
PAIN - ADULT    • Verbalizes/displays adequate comfort level or patient's stated pain goal Adequate for Discharge        SAFETY ADULT - FALL    • Free from fall injury Adequate for Discharge        Right lower leg with post mold and ace wrap dressing in pl

## 2019-01-22 LAB
ANION GAP SERPL CALC-SCNC: 6 MMOL/L (ref 0–18)
BASOPHILS # BLD AUTO: 0.04 X10(3) UL (ref 0–0.1)
BASOPHILS NFR BLD AUTO: 0.6 %
BUN BLD-MCNC: 16 MG/DL (ref 8–20)
BUN/CREAT SERPL: 17.4 (ref 10–20)
CALCIUM BLD-MCNC: 8.2 MG/DL (ref 8.3–10.3)
CHLORIDE SERPL-SCNC: 100 MMOL/L (ref 101–111)
CO2 SERPL-SCNC: 30 MMOL/L (ref 22–32)
CREAT BLD-MCNC: 0.92 MG/DL (ref 0.55–1.02)
EOSINOPHIL # BLD AUTO: 0.27 X10(3) UL (ref 0–0.3)
EOSINOPHIL NFR BLD AUTO: 4 %
ERYTHROCYTE [DISTWIDTH] IN BLOOD BY AUTOMATED COUNT: 13.2 % (ref 11.5–16)
GLUCOSE BLD-MCNC: 172 MG/DL (ref 65–99)
GLUCOSE BLD-MCNC: 194 MG/DL (ref 65–99)
GLUCOSE BLD-MCNC: 228 MG/DL (ref 65–99)
GLUCOSE BLD-MCNC: 298 MG/DL (ref 70–99)
HCT VFR BLD AUTO: 33.8 % (ref 34–50)
HGB BLD-MCNC: 11 G/DL (ref 12–16)
IMMATURE GRANULOCYTE COUNT: 0.03 X10(3) UL (ref 0–1)
IMMATURE GRANULOCYTE RATIO %: 0.4 %
LYMPHOCYTES # BLD AUTO: 0.7 X10(3) UL (ref 0.9–4)
LYMPHOCYTES NFR BLD AUTO: 10.3 %
MCH RBC QN AUTO: 29.6 PG (ref 27–33.2)
MCHC RBC AUTO-ENTMCNC: 32.5 G/DL (ref 31–37)
MCV RBC AUTO: 91.1 FL (ref 81–100)
MONOCYTES # BLD AUTO: 0.42 X10(3) UL (ref 0.1–1)
MONOCYTES NFR BLD AUTO: 6.2 %
NEUTROPHIL ABS PRELIM: 5.32 X10 (3) UL (ref 1.3–6.7)
NEUTROPHILS # BLD AUTO: 5.32 X10(3) UL (ref 1.3–6.7)
NEUTROPHILS NFR BLD AUTO: 78.5 %
OSMOLALITY SERPL CALC.SUM OF ELEC: 294 MOSM/KG (ref 275–295)
PLATELET # BLD AUTO: 270 10(3)UL (ref 150–450)
POTASSIUM SERPL-SCNC: 4.4 MMOL/L (ref 3.6–5.1)
RBC # BLD AUTO: 3.71 X10(6)UL (ref 3.8–5.1)
RED CELL DISTRIBUTION WIDTH-SD: 43.8 FL (ref 35.1–46.3)
SODIUM SERPL-SCNC: 136 MMOL/L (ref 136–144)
WBC # BLD AUTO: 6.8 X10(3) UL (ref 4–13)

## 2019-01-22 PROCEDURE — 99232 SBSQ HOSP IP/OBS MODERATE 35: CPT | Performed by: INTERNAL MEDICINE

## 2019-01-22 RX ORDER — LOSARTAN POTASSIUM 25 MG/1
25 TABLET ORAL ONCE
Status: COMPLETED | OUTPATIENT
Start: 2019-01-22 | End: 2019-01-22

## 2019-01-22 RX ORDER — LOSARTAN POTASSIUM 50 MG/1
50 TABLET ORAL DAILY
Status: DISCONTINUED | OUTPATIENT
Start: 2019-01-23 | End: 2019-01-23

## 2019-01-22 RX ORDER — ACETAMINOPHEN 325 MG/1
650 TABLET ORAL EVERY 6 HOURS PRN
Qty: 30 TABLET | Refills: 0 | Status: SHIPPED | OUTPATIENT
Start: 2019-01-22 | End: 2019-01-24

## 2019-01-22 RX ORDER — ACETAMINOPHEN 325 MG/1
650 TABLET ORAL EVERY 6 HOURS PRN
Status: DISCONTINUED | OUTPATIENT
Start: 2019-01-22 | End: 2019-01-23

## 2019-01-22 NOTE — CM/SW NOTE
Spoke with [address] [place] [address] liaison. She notes that facility did contact Christine again and was informed that case is still under review and that Christine will notify facility when determination made.

## 2019-01-22 NOTE — PHYSICAL THERAPY NOTE
PHYSICAL THERAPY TREATMENT NOTE - INPATIENT    Room Number: 380/380-A     Session: 4  Number of Visits to Meet Established Goals: 4    Presenting Problem: Right ankle trimalleolar fracture S/p ORIF on 1/17/19     History related to current admission: Pt i /91 - RN - Gen was notified     SPO2 94% on Room air    AM-PAC '6-Clicks' INPATIENT SHORT FORM - BASIC MOBILITY  How much difficulty does the patient currently have. ..  -   Turning over in bed (including adjusting bedclothes, sheets and blankets)?: in chair;Needs met;Call light within reach;RN aware of session/findings; All patient questions and concerns addressed;SCDs in place; Discussed recommendations with /    ASSESSMENT     Patient is a 66year old female admitted on 1/16/ Comments: Goals established on 1/18/2019,Ongoing 01/20/19,Ongoing 01/22/19

## 2019-01-22 NOTE — PLAN OF CARE
PAIN - ADULT    • Verbalizes/displays adequate comfort level or patient's stated pain goal Adequate for Discharge    Verbalized she has good tolerance with pain. Right lower leg with short leg post.mold covered with ace wrap.   Right leg with good capillar

## 2019-01-22 NOTE — PROGRESS NOTES
ENDOCRINOLOGY PROGRESS NOTE    Typed by Lisa Cohen MD on 1/22/2019      S: Pt sitting up in the chair. Still waiting to hear if she will be discharged today to rehab.       O: /57 (BP Location: Left arm)   Pulse 90   Temp 98.3 °F (36.8 ° Platelet Count      952.0 - 450.0 10(3)uL 270.0         ASSESSMENT AND PLAN:    1. Type 1 DM: uncontrolled with hyperglycemia: HgA1C 7.9%. Glucoses are elevated. 2. Long term insulin use  3.  Insulin pump status      · Continue regimen as follows: Metabolism  [facility]

## 2019-01-22 NOTE — PROGRESS NOTES
[facility]  Progress Note     Patient Status:  Inpatient    10/22/1955 MRN [id number]   [facility] 3SW-A Attending Chun Bass MD   Hosp Day # 6 PCP None Pcp     CC: Status post mechanical fall at home and right an Component Value Date    [facility] 228 01/22/2019       Imaging:   XR CHEST AP PORTABLE      TECHNIQUE:  AP chest radiograph was obtained. COMPARISON:  None.      INDICATIONS:  pre-op     PATIENT STATED HISTORY: (As transcribed by Technologist)  Patient fel tab 1 tablet 1 tablet Oral Q4H PRN   Or      HYDROcodone-acetaminophen (NORCO)  MG per tab 2 tablet 2 tablet Oral Q4H PRN   etomidate (AMIDATE) solution 10 mg 10 mg Intravenous PRN   atorvastatin (LIPITOR) tab 10 mg 10 mg Oral Nightly   Losartan Pota 3. Essential hypertension–continue home medication. Follow blood pressure. Blood pressure controlled today  4. Hyperlipidemia–continue statin     Quality:  · DVT Prophylaxis: SCD.  Eliquis  · CODE status: full  · Snider: no      Estimated date of dischar

## 2019-01-22 NOTE — PROGRESS NOTES
Complaint of occasional burning pain to right inner ankle and anterior carrera. Dr. Matilda Brandon was notified of patient's complaint.   Dr. Matilda Brandon seen and examined patient, explained to her that burning pain is normal and is related to screws that's in place, patifaustino

## 2019-01-22 NOTE — PROGRESS NOTES
S/p right ankle ORIF  Doing well  No current issues  Splint c/d/i  Comp soft  No pain with passive stretch  NVI    PLAN  NWB RLE  F/u donna in 1 week  PT  Pain control  eliquis for 2 weeks post surgical date  Can be dc'd per ortho

## 2019-01-23 VITALS
WEIGHT: 268 LBS | BODY MASS INDEX: 47.48 KG/M2 | OXYGEN SATURATION: 94 % | HEIGHT: 63 IN | TEMPERATURE: 98 F | HEART RATE: 90 BPM | SYSTOLIC BLOOD PRESSURE: 148 MMHG | RESPIRATION RATE: 18 BRPM | DIASTOLIC BLOOD PRESSURE: 58 MMHG

## 2019-01-23 LAB
GLUCOSE BLD-MCNC: 181 MG/DL (ref 65–99)
GLUCOSE BLD-MCNC: 261 MG/DL (ref 65–99)

## 2019-01-23 PROCEDURE — 99238 HOSP IP/OBS DSCHRG MGMT 30/<: CPT | Performed by: INTERNAL MEDICINE

## 2019-01-23 NOTE — PROGRESS NOTES
ENDOCRINOLOGY PROGRESS NOTE    Typed by Darius Gutierrez MD on 1/23/2019      S: Pt sitting up in the chair. Still waiting to hear if she will be discharged today to rehab.       O: /63 (BP Location: Left arm)   Pulse 91   Temp 98.3 °F (36.8 ° 270.0         ASSESSMENT AND PLAN:    1. Type 1 DM: uncontrolled with hyperglycemia: HgA1C 7.9%. Glucoses are stable. 2. Long term insulin use  3.  Insulin pump status      · Continue regimen as follows:         Pump Name: Medtronic 670G Pump using Novolog

## 2019-01-23 NOTE — CM/SW NOTE
01/23/19 1500   Discharge disposition   Expected discharge disposition Skilled Nurs   Name of John Cronin   Patient is Discharged to a [address] Yes   Discharge transportation Greg Long

## 2019-01-23 NOTE — CM/SW NOTE
Call from Maggie Bynum with [facility] [phone]. Facility has obtained insurance auth and can accept today after Carolina Cazares requested -bariatric wheelchair with elevating leg rests -accepted for 4:30PM transport.     RN updated and will call

## 2019-01-23 NOTE — DISCHARGE SUMMARY
[facility]  Discharge Summary    Giovanny Christensen Patient Status:  Inpatient    10/22/1955 MRN [id number]   [facility] 3SW-A Attending Ap Contreras MD   [facility] Day # 7 PCP None Pcp     Date of Admission: 2019    Date of [address] exertional chest pain or shortness of breath.   In the ER imaging showed right ankle dislocation and trimalleolar right ankle fracture and right ankle right leg and short leg posterior mold and orthopedics consulted and is being admitted to orthopedic floor Risk of readmission after discharge from the hospital.        Consultations:   Kelechi Aleman MD   Physician   Orthopedics     Olimpia Ji MD   Physician   Cardiology     Jessica Lau MD   Physician   Endocrinology       Procedures during hospi aspart 100 UNIT/ML Soln  Commonly known as:  NOVOLOG  Notes to patient:  Patient her own insulin pump. Up to 75 units daily via insulin pump. Quantity:  70 mL  Refills:  3     Irbesartan 75 MG Tabs      Take 1 tablet (75 mg total) by mouth daily. mucosa moist  Neck: no adenopathy, no carotid bruit, no JVD  Lungs: clear to auscultation bilaterally  Heart: S1, S2 normal, no murmur,  regular rate and rhythm  Abdomen: soft, non-tender; bowel sounds normal  Extremities: Right ankle in dressing and splin

## 2019-01-23 NOTE — PHYSICAL THERAPY NOTE
PHYSICAL THERAPY TREATMENT NOTE - INPATIENT    Room Number: 380/380-A     Session: 5   Number of Visits to Meet Established Goals: 4    Presenting Problem: Right ankle trimalleolar fracture S/p ORIF on 1/17/19    History related to current admission: Pt i bed (including adjusting bedclothes, sheets and blankets)?: A Little   -   Sitting down on and standing up from a chair with arms (e.g., wheelchair, bedside commode, etc.): A Little   -   Moving from lying on back to sitting on the side of the bed?: A Justus with mobility tasks. DISCHARGE RECOMMENDATIONS  PT Discharge Recommendations: Sub-acute rehabilitation     PLAN  PT Treatment Plan: Bed mobility; Endurance; Energy conservation;Patient education; Family education;Gait training;Neuromuscular re-educate;Str

## 2019-01-23 NOTE — PROGRESS NOTES
[facility]  Progress Note    Vani Tate Patient Status:  Inpatient    10/22/1955 MRN [id number]   [facility] 3SW-A Attending Payal Lundy MD   Hosp Day # 7 PCP None Pcp     CC: Status post mechanical fall at home and right an Component Value Date    WBC 6.8 01/22/2019    HGB 11.0 01/22/2019    HCT 33.8 01/22/2019    .0 01/22/2019    CREATSERUM 0.92 01/22/2019    BUN 16 01/22/2019     01/22/2019    K 4.4 01/22/2019     01/22/2019    CO2 30.0 01/22/2019    GL ondansetron HCl (ZOFRAN) injection 4 mg 4 mg Intravenous Q6H PRN   PEG 3350 (MIRALAX) powder packet 17 g 17 g Oral Daily PRN   docusate sodium (COLACE) cap 100 mg 100 mg Oral BID   bisacodyl (DULCOLAX) rectal suppository 10 mg 10 mg Rectal Daily PRN   HY consult Dr. Juan Khoury for diabetes mellitus type 2 insulin management in hospital  2. Accu-Cheks ranging from 172 to 181  Endocrinologist following; patient back on her insulin pump   3. Essential hypertension–continue home medication. Follow blood pressure.

## 2019-01-23 NOTE — PLAN OF CARE
Report called to Lis Gutierres RN at [facility]. Transport to  pt after 5 for dc.

## 2019-01-24 ENCOUNTER — SNF VISIT (OUTPATIENT)
Dept: INTERNAL MEDICINE CLINIC | Age: 64
End: 2019-01-24

## 2019-01-24 VITALS
SYSTOLIC BLOOD PRESSURE: 154 MMHG | BODY MASS INDEX: 47 KG/M2 | HEART RATE: 86 BPM | OXYGEN SATURATION: 95 % | TEMPERATURE: 98 F | DIASTOLIC BLOOD PRESSURE: 84 MMHG | RESPIRATION RATE: 18 BRPM | WEIGHT: 268 LBS

## 2019-01-24 DIAGNOSIS — M85.80 OSTEOPENIA, UNSPECIFIED LOCATION: ICD-10-CM

## 2019-01-24 DIAGNOSIS — E78.3 HYPERCHYLOMICRONEMIA: ICD-10-CM

## 2019-01-24 DIAGNOSIS — E10.9 TYPE 1 DIABETES MELLITUS WITHOUT COMPLICATION (HCC): ICD-10-CM

## 2019-01-24 DIAGNOSIS — R53.1 WEAKNESS GENERALIZED: ICD-10-CM

## 2019-01-24 DIAGNOSIS — S82.891D CLOSED FRACTURE OF RIGHT ANKLE WITH ROUTINE HEALING, SUBSEQUENT ENCOUNTER: Primary | ICD-10-CM

## 2019-01-24 DIAGNOSIS — R26.9 GAIT DISTURBANCE: ICD-10-CM

## 2019-01-24 DIAGNOSIS — I10 ESSENTIAL HYPERTENSION: ICD-10-CM

## 2019-01-24 PROCEDURE — 99310 SBSQ NF CARE HIGH MDM 45: CPT | Performed by: NURSE PRACTITIONER

## 2019-01-24 RX ORDER — ACETAMINOPHEN 500 MG
1000 TABLET ORAL EVERY 6 HOURS PRN
COMMUNITY

## 2019-01-24 NOTE — PROGRESS NOTES
Jacqueline Plummer  : 10/22/1955  Age 66year old  female patient is admitted to Facility: [facility] for [facility] status post orif of right ankle fracture    [facility] Admit date:  19  Discharge date to [facility]:  19  ELOS:  16 - 19 days No      Frequency: Never    Drug use: No      ALLERGIES:  No Known Allergies    CODE STATUS:  Full Code    ADVANCED CARE PLANNING TEAM:       CURRENT MEDICATIONS     Current Outpatient Medications:  acetaminophen 500 MG Oral Tab Take 1,000 mg by mouth ever vaginal discharge; no urinary incontinence; no hematuria  MUSCULOSKELETAL:---Full ROM in the upper extremities. Decreased ROM in the lower extremities.      NEURO:no sensory or motor complaint, denies seizures, denies vertigo, denies tinnitus and denies tr 11.7  HCT 34.5    [employer], notes, lab and imaging results reviewed. Medication reconciliation completed. SEE PLAN BELOW  Right malleolar fracture/gait disturbance/weakness  1. PT/OT eval and treat  2. Incentive spirometry  3.  NWB to

## 2019-01-24 NOTE — PAYOR COMM NOTE
--------------REQUESTING IMMEDIATE RECONSIDERATION FOR INPATIENT SERVICES.   APPROPRIATE FOR INPATIENT GUIDELINES FOR IV PAIN MEDS 7 DOSES OF MORPHINE IVP WITHIN 24 HRS  PLEASE SEE BELOW    1/16 MORPHINE  8:06 AM 2MG IVP  8:31 AM 2MG IVP  11:31 AM 2MG IVP

## 2019-01-24 NOTE — PAYOR COMM NOTE
--------------PATIENT WAS DISCHARGED TO REHAB ON 1/23      DISCHARGE REVIEW    Payor: [address] #:  [id number]  Authorization Number: [id number]    Admit date: 1/16/19  Admit time:  5967  Discharge Date: 1/23/2019  5:3 /63 (BP Location: Left arm)   Pulse 91   Temp 98.3 °F (36.8 °C) (Oral)   Resp 18   Ht 5' 3\" (1.6 m)   Wt 268 lb (121.6 kg)   SpO2 93%   BMI 47.47 kg/m²         General appearance: alert and cooperative  Head: Normocephalic, without obvious abnormali 1. Left ventricle: The cavity size was normal. Wall thickness was normal.     Systolic function was normal. The estimated ejection fraction was 60-65%.     No diagnostic evidence for regional wall motion abnormalities.   2. Right ventricle: Systolic functio Glucose-Vitamin C (DEX-4) 4-6 GM-MG chewable tab 8 tablet 8 tablet Oral Q15 Min PRN         ASSESSMENT / PLAN:         1.  Right ankle fracture with complete oblique fracture involving the distal right fibula with proximal and posterior displacement with so

## 2019-01-29 ENCOUNTER — SNF VISIT (OUTPATIENT)
Dept: INTERNAL MEDICINE CLINIC | Age: 64
End: 2019-01-29

## 2019-01-29 VITALS
HEART RATE: 85 BPM | WEIGHT: 265.38 LBS | OXYGEN SATURATION: 93 % | TEMPERATURE: 98 F | RESPIRATION RATE: 18 BRPM | DIASTOLIC BLOOD PRESSURE: 69 MMHG | SYSTOLIC BLOOD PRESSURE: 149 MMHG | BODY MASS INDEX: 47 KG/M2

## 2019-01-29 DIAGNOSIS — S82.891D CLOSED FRACTURE OF RIGHT ANKLE WITH ROUTINE HEALING, SUBSEQUENT ENCOUNTER: Primary | ICD-10-CM

## 2019-01-29 DIAGNOSIS — E10.9 TYPE 1 DIABETES MELLITUS WITHOUT COMPLICATION (HCC): ICD-10-CM

## 2019-01-29 DIAGNOSIS — R26.9 GAIT DISTURBANCE: ICD-10-CM

## 2019-01-29 PROCEDURE — 99308 SBSQ NF CARE LOW MDM 20: CPT | Performed by: NURSE PRACTITIONER

## 2019-01-29 NOTE — PROGRESS NOTES
Vani Tate, 10/22/1955, 66year old, female    Chief Complaint:  Patient presents with: Follow - Up: Right trimaller fracture s/p orif  Hyperglycemia  Lab Results     Subjective:  PMH:  T1 DM, HTN, OSTEOPENIA, OBESITY.   In [facility]    S/P right trimale but CMS to right toes intact  NEUROLOGIC: intact; no sensorimotor deficit, cranial nerves intact II-XII, follows commands  PSYCHIATRIC: alert and oriented x 3; affect appropriate    Medications reviewed: Yes    Diagnostics reviewed:    LABS 1.29.19  WBC 6.

## 2019-01-31 ENCOUNTER — SNF VISIT (OUTPATIENT)
Dept: INTERNAL MEDICINE CLINIC | Age: 64
End: 2019-01-31

## 2019-01-31 VITALS
OXYGEN SATURATION: 97 % | WEIGHT: 266.81 LBS | RESPIRATION RATE: 18 BRPM | HEART RATE: 85 BPM | TEMPERATURE: 99 F | SYSTOLIC BLOOD PRESSURE: 140 MMHG | BODY MASS INDEX: 47 KG/M2 | DIASTOLIC BLOOD PRESSURE: 74 MMHG

## 2019-01-31 DIAGNOSIS — S82.891D CLOSED FRACTURE OF RIGHT ANKLE WITH ROUTINE HEALING, SUBSEQUENT ENCOUNTER: Primary | ICD-10-CM

## 2019-01-31 DIAGNOSIS — E10.9 TYPE 1 DIABETES MELLITUS WITHOUT COMPLICATION (HCC): ICD-10-CM

## 2019-01-31 DIAGNOSIS — R26.9 GAIT DISTURBANCE: ICD-10-CM

## 2019-01-31 DIAGNOSIS — R53.1 WEAKNESS GENERALIZED: ICD-10-CM

## 2019-01-31 PROCEDURE — 99308 SBSQ NF CARE LOW MDM 20: CPT | Performed by: NURSE PRACTITIONER

## 2019-01-31 NOTE — PROGRESS NOTES
Kylie Sanon, 10/22/1955, 66year old, female    Chief Complaint:  Patient presents with: Follow - Up: Right trimaller fracture s/p orif  Weakness     Subjective:  PMH:  T1 DM, HTN, OSTEOPENIA, OBESITY.   In [facility] S/P right trimaleolar fracture and CLEMENTE normal, RRR; no S3, no S4; , no click, no murmur  ABDOMEN:  normal active BS+, soft, nondistended; no organomegaly, no masses; no bruits; nontender, no guarding, no rebound tenderness.   :Deferred  LYMPHATIC:no lymphedema  MUSCULOSKELETAL: no acute synovi

## 2019-02-05 ENCOUNTER — SNF VISIT (OUTPATIENT)
Dept: INTERNAL MEDICINE CLINIC | Age: 64
End: 2019-02-05

## 2019-02-05 VITALS
WEIGHT: 266.81 LBS | OXYGEN SATURATION: 96 % | DIASTOLIC BLOOD PRESSURE: 71 MMHG | TEMPERATURE: 98 F | RESPIRATION RATE: 18 BRPM | SYSTOLIC BLOOD PRESSURE: 148 MMHG | HEART RATE: 78 BPM | BODY MASS INDEX: 47 KG/M2

## 2019-02-05 DIAGNOSIS — E10.9 TYPE 1 DIABETES MELLITUS WITHOUT COMPLICATION (HCC): ICD-10-CM

## 2019-02-05 DIAGNOSIS — R26.9 GAIT DISTURBANCE: Primary | ICD-10-CM

## 2019-02-05 PROCEDURE — 99308 SBSQ NF CARE LOW MDM 20: CPT | Performed by: NURSE PRACTITIONER

## 2019-02-05 NOTE — PROGRESS NOTES
Michelle Hernandez, 10/22/1955, 66year old, female    Chief Complaint:  Patient presents with: Follow - Up: Right trimaller fracture s/p orif  Hyperglycemia  Lab Results     Subjective:  PMH:  T1 DM, HTN, OSTEOPENIA, OBESITY.   In [facility] S/P right trimaleola extremity  EXTREMITIES/VASCULAR:no cyanosis, clubbing or edema, radial pulses 2+ and dorsalis pedal pulses 2+; RLE edematous but CMS to right toes intact  NEUROLOGIC: intact; no sensorimotor deficit, cranial nerves intact II-XII, follows commands  PSYCHIAT

## 2019-02-07 ENCOUNTER — SNF DISCHARGE (OUTPATIENT)
Dept: INTERNAL MEDICINE CLINIC | Age: 64
End: 2019-02-07

## 2019-02-07 VITALS
HEART RATE: 77 BPM | RESPIRATION RATE: 18 BRPM | BODY MASS INDEX: 47 KG/M2 | WEIGHT: 266.81 LBS | OXYGEN SATURATION: 98 % | TEMPERATURE: 98 F | SYSTOLIC BLOOD PRESSURE: 136 MMHG | DIASTOLIC BLOOD PRESSURE: 78 MMHG

## 2019-02-07 DIAGNOSIS — R26.9 GAIT DISTURBANCE: Primary | ICD-10-CM

## 2019-02-07 DIAGNOSIS — E10.9 TYPE 1 DIABETES MELLITUS WITHOUT COMPLICATION (HCC): ICD-10-CM

## 2019-02-07 DIAGNOSIS — I10 ESSENTIAL HYPERTENSION: ICD-10-CM

## 2019-02-07 DIAGNOSIS — S82.891D CLOSED FRACTURE OF RIGHT ANKLE WITH ROUTINE HEALING, SUBSEQUENT ENCOUNTER: ICD-10-CM

## 2019-02-07 DIAGNOSIS — E78.3 HYPERCHYLOMICRONEMIA: ICD-10-CM

## 2019-02-07 DIAGNOSIS — R53.1 WEAKNESS GENERALIZED: ICD-10-CM

## 2019-02-07 DIAGNOSIS — M85.80 OSTEOPENIA, UNSPECIFIED LOCATION: ICD-10-CM

## 2019-02-07 PROCEDURE — 99316 NF DSCHRG MGMT 30 MIN+: CPT | Performed by: NURSE PRACTITIONER

## 2019-02-07 NOTE — PROGRESS NOTES
Lou Santiago, 10/22/1955, 66year old, female is being discharged from Facility: [facility]    Date of Admission:1.23.19    Date of Discharge:  Anticipated on 2.9.19                                Admitting Diagnoses: PERRLA, EOMI, sclera anicteric, conjunctiva normal; there is no nystagmus, no drainage from eyes; +slight cataracts b/l  HENT: normocephalic; normal nose, no nasal drainage, mucous membranes pink, moist, pharynx no exudate, no visible cerumen.   NECK: suppl stat  10. Needs follow up with Dr. Carly laureano on 2.18.19     Hypertension   5. Amlodipine 10 mg daily  6. Irbesartan 75 mg daily  7. ASA 81 mg po qd     Osteopenia  1. Calcium 250/125 mg daily  2. Multi Vitamins 1 tab po qd     Diabetes Type I  1.  LCS  2

## 2019-03-27 PROBLEM — S82.891D CLOSED FRACTURE OF RIGHT ANKLE WITH ROUTINE HEALING, SUBSEQUENT ENCOUNTER: Status: ACTIVE | Noted: 2019-03-27

## (undated) DEVICE — CHLORAPREP 26ML APPLICATOR

## (undated) DEVICE — SURGICAL INSTRUMENT OR ACCESSORY FOR ORTHOPEDIC BONE FIXATION: Brand: POWER DRIVER G-WIRE KIT, 3.0-4.5MM CANNULATED SCREWS

## (undated) DEVICE — KENDALL SCD EXPRESS SLEEVES, KNEE LENGTH, MEDIUM: Brand: KENDALL SCD

## (undated) DEVICE — LOWER EXTREMITY CDS-LF: Brand: MEDLINE INDUSTRIES, INC.

## (undated) DEVICE — SOL  .9 1000ML BTL

## (undated) DEVICE — SUTURE VICRYL 2-0 CT-2

## (undated) DEVICE — ABDOMINAL PAD: Brand: DERMACEA

## (undated) DEVICE — SUTURE VICRYL 3-0 SH

## (undated) DEVICE — OCCLUSIVE GAUZE STRIP OVERWRAP,3% BISMUTH TRIBROMOPHENATE IN PETROLATUM BLEND: Brand: XEROFORM

## (undated) DEVICE — SURGICAL INSTRUMENT OR ACCESSORY FOR ORTHOPEDIC BONE FIXATION: Brand: 2.5MM DRILL BIT, AO, FOR 3.5MM SCREWS

## (undated) DEVICE — SUTURE ETHILON 3-0 PS-1

## (undated) DEVICE — GAMMEX® PI HYBRID SIZE 6.5, STERILE POWDER-FREE SURGICAL GLOVE, POLYISOPRENE AND NEOPRENE BLEND: Brand: GAMMEX

## (undated) DEVICE — 3M™ COBAN™ NL STERILE NON-LATEX SELF-ADHERENT WRAP, 2084S, 4 IN X 5 YD (10 CM X 4,5 M), 18 ROLLS/CASE: Brand: 3M™ COBAN™

## (undated) DEVICE — SURGICAL INSTRUMENT OR ACCESSORY FOR ORTHOPEDIC BONE FIXATION: Brand: CANNULATED DRILL BIT, 3.0MM

## (undated) DEVICE — STERILE POLYISOPRENE POWDER-FREE SURGICAL GLOVES: Brand: PROTEXIS

## (undated) DEVICE — SURGICAL INSTRUMENT OR ACCESSORY FOR ORTHOPEDIC BONE FIXATION: Brand: FLOWER E-KIT, ADVANCED

## (undated) NOTE — IP AVS SNAPSHOT
Patient Demographics     Address  [address] [place] [address]  [facility] [address] Phone  [phone] [facility]) *Preferred*  [phone] [facility]) E-mail Address  [email or web address]      Emergency Contact(s)     Name Relation Home Work [employer], [address] [facility] other changes in skin  appearance    Leg Elevation  Leg elevation will help to minimize pain, improve healing, and decrease the risk of infection.   · Keep your leg elevated with your foot placed higher than hip level  · Place foot on pillow with leg extend · Call Dr. Dunbar Horse office if you experience severe pain not controlled by pain medication, swelling, numbness, tingling, bleeding, fever, or other concerns.     · If your call is made after office hours, a physician's assistant or nurse practitioner will be [address]             Call Teresita Ambriz MD.    Specialties:  Cardiovascular Diseases, CARDIOLOGY  Contact information:  [address] [phone] Notes to patient:  Patient her own insulin pump. Up to 75 units daily via insulin pump. Dori Walker MD         Irbesartan 75 MG Tabs  Next dose due: Tomorrow morning 1/23/19 9 am      Take 1 tablet (75 mg total) by mouth daily.    Jade Faith Patient Weight  121.6 kg (268 lb)      Lab Results Last 24 Hours    No matching results found     Microbiology Results (All)     None         H&P - H&P Note      H&P signed by Heath Atwood MD at 1/16/2019 11:28 AM  Version 2 of 2    Author:  Heath Atwood normal.  Patient states she is very active before the fall with no exertional chest pain or shortness of breath.   In the ER imaging showed right ankle dislocation and trimalleolar right ankle fracture and right ankle right leg and short leg posterior mold tenderness on palpation. Denies any hip pain or tenderness. [MJ.3]    Integument:[MJ.1] Right leg in short leg cast, no  ecchymosis seen anywhere else in the body[MJ.3]  Psychiatric: Appropriate mood and affect.       Diagnostic Data:      Laboratory Data:[ with some angulation. There is a transverse fracture involving the medial malleolus with lateral displacement. There is posterior lateral dislocation of the talus with respect to the distal right tibia.   There is a posterior malleolar fracture of the d =====  CONCLUSION:  There is a complete oblique fracture involving the distal right fibula with proximal and posterior displacement with some angulation. There is a transverse fracture involving the medial malleolus with lateral displacement.   There is H&P signed by Elba Henderson MD at 1/16/2019 11:27 AM  Version 1 of 2    Author:  Elba Henderson MD Service:  — Author Type:  Physician    Filed:  1/16/2019 11:27 AM Date of Service:  1/16/2019 10:10 AM Status:  Signed    :  Elba Henderson MD (Physic ankle fracture and right ankle right leg and short leg posterior mold and orthopedics consulted and is being admitted to orthopedic floor[MJ.3]    History:[MJ.1]  Past Medical History:   Diagnosis Date   • Diabetes [facility])    • Essential hypertension Diagnostic Data:      Laboratory Data:[MJ.1]   Lab Results   Component Value Date    WBC 9.3 01/16/2019    HGB 12.8 01/16/2019    HCT 37.8 01/16/2019    .0 01/16/2019    CREATSERUM 0.95 01/16/2019    BUN 13 01/16/2019     01/16/2019    K 4.4 0 right tibia with proximal and posterior displacement. SOFT TISSUES:  Marked soft tissue swelling. EFFUSION:  None visible. OTHER:  Negative.      CONCLUSION:  There is a complete oblique fracture involving the distal right fibula with proximal and poster posterior lateral dislocation of the talus with respect to the distal right tibia. Dictated by: Ceferino Huffman MD on 1/16/2019 at 7:39[MJ.3]                 ASSESSMENT / PLAN:[MJ.1]     1.  Right ankle fracture with complete oblique fracture involvi Sue Wakefield MD at [address]   Report of Consultation    Debra Grace Patient Status:  Inpatient    10/22/1955 MRN [id number]   [facility] 3SW-A Attending Elba Henderson MD   [facility] Day # 1 PCP None Pcp     Christopher Franklin Problem Relation Age of Onset   • Cancer Mother    • Hypertension Mother    • Heart Disorder Mother    • Heart Disorder Father       reports that  has never smoked.  she has never used smokeless tobacco. She reports that she does not drink alcohol or use dr Units, Subcutaneous, TID CC    Review of Systems:  A comprehensive review of systems was negative. 10 point system review    Physical Exam:    General: Alert, orientated x3. Cooperative. No apparent distress.   Vital Signs:  Blood pressure 142/54, pulse 80 injury, vessel damage, loss of limb/amputation, fracture/bony injury, continued pain, painful scar, failure of surgery, and need for future surgery.   We also discussed the surgical benefits, alternatives, surgical room allotments and personnel, expected ou Dislocation of right ankle joint, initial encounter      Past Medical History  Past Medical History:   Diagnosis Date   • Diabetes [facility])    • Essential hypertension        Past Surgical History  Past Surgical History:   Procedure Laterality Date   • ABSC Gait Assessment   Gait Assistance: Dependent assistance(actual min assist)  Distance (ft): 15  Assistive Device: Rolling walker  Pattern: Comment(NWB R LE)  Stoop/Curb Assistance: Not tested  Comment : scores per FIMS scale    Skilled Therapy Provided:[KT. Chair/Wheelchair at assistance level: minimum assistance -- Met 01/20/19, New goal : transfers with supervision      Goal #3 Patient is able to ambulate 10 feet with assist device: walker - rolling at assistance level: minimum assistance -- Met 01/20/19,Ne • ABSCESS DRAINAGE X-RAY/US/CT CTRL     • ANKLE OPEN REDUCTION INTERNAL FIXATION Right 1/17/2019    Performed by Lionell Boxer, MD at [facility] MAIN OR   • REMOVAL OF OVARIAN CYST(S)         SUBJECTIVE  \" I ca not walk any more. My arms are getting tired. I sti Distance (ft): 11 ft  Assistive Device: Rolling walker  Pattern: (NWB right LE)  Stoop/Curb Assistance: Not tested  Comment : Above score is based on FIM definations    Skilled Therapy Provided: The patient was received in supine.  Patient performed RLE exe supports that patients with this level of impairment may benefit from Sub Acute Rehab @ d/c with goal of achieving supervision to modified independent level of functional mobility. PLAN  PT Treatment Plan: Bed mobility; Endurance; Energy conservation; Pa Closed fracture of right ankle    Dislocation of right ankle joint, initial encounter      Past Medical History  Past Medical History:   Diagnosis Date   • Diabetes [facility])    • Essential hypertension        Past Surgical History  Past Surgical History: FUNCTIONAL ABILITY STATUS  Gait Assessment   Gait Assistance: Not tested  Distance (ft): 10 ft  Assistive Device: Rolling walker  Pattern: (NWB on right LE,)  Stoop/Curb Assistance: Not tested  Comment : Above score is based on FIM definations    Skilled T The AM-PAC '6-Clicks' Inpatient Basic Mobility Short Form was completed and this patient is demonstrating a 54.16% degree of impairment in mobility.  Research supports that patients with this level of impairment may benefit from DC to [facility] with ELOS 16-19 Room Number: 380/380-A  Session: 2[BW.1]   Number of Visits to Meet Established Goals: 4    Presenting Problem: right ankle ORIF[BW.2]    History related to current admission: Admitted via ED after falling on ice,  fracturing R ankle.  S/p R ankle ORIF 1/17 -   Putting on and taking off regular upper body clothing?: A Little  -   Taking care of personal grooming such as brushing teeth?: A Little  -   Eating meals?: A Little[BW. 2]    AM-PAC Score:[BW.1]  Score: 15  Approx Degree of Impairment: 56.46%  Standard Patient seen for OT services this pm. In this session patient making good progress towards OT goals with improvements in balance, endurance and independence in ADL tasks, however patient remains below her baseline in these and other functional areas.  Debi 4/8/2019 2:00 PM Payton Dunn MD [address] [place] [address]

## (undated) NOTE — LETTER
Nora Epperson [address] [address]  [place], [address] [facility]    Consent for Operation  Date: __________________                                Time: _______________    1.  I authorize the performance upon Chidi Augustin the following operation:  Proced procedure has been videotaped, the surgeon will obtain the original videotape. The hospital will not be responsible for storage or maintenance of this tape.   7. For the purpose of advancing medical education, I consent to the admittance of observers to the STATEMENTS REQUIRING INSERTION OR COMPLETION WERE FILLED IN.     Signature of Patient:   ___________________________    When the patient is a minor or mentally incompetent to give consent:  Signature of person authorized to consent for patient: ____________ supplements, and pills I can buy without a prescription (including street drugs/illegal medications). Failure to inform my anesthesiologist about these medicines may increase my risk of anesthetic complications. iv.  If I am allergic to anything or have ha Anesthesiologist Signature     Date   Time  I have discussed the procedure and information above with the patient (or patient’s representative) and answered their questions. The patient or their representative has agreed to have anesthesia services.     ___

## (undated) NOTE — LETTER
Nora Epperson [address] [address]  [place], [address] [facility]    Consent for Operation  Date: __________________                                Time: _______________    1.  I authorize the performance upon Nicolette Naiton the following operation:  Proced procedure has been videotaped, the surgeon will obtain the original videotape. The hospital will not be responsible for storage or maintenance of this tape.   7. For the purpose of advancing medical education, I consent to the admittance of observers to the STATEMENTS REQUIRING INSERTION OR COMPLETION WERE FILLED IN.     Signature of Patient:   ___________________________    When the patient is a minor or mentally incompetent to give consent:  Signature of person authorized to consent for patient: ____________ supplements, and pills I can buy without a prescription (including street drugs/illegal medications). Failure to inform my anesthesiologist about these medicines may increase my risk of anesthetic complications. iv.  If I am allergic to anything or have ha Anesthesiologist Signature     Date   Time  I have discussed the procedure and information above with the patient (or patient’s representative) and answered their questions. The patient or their representative has agreed to have anesthesia services.     ___